# Patient Record
Sex: FEMALE | Race: WHITE | Employment: OTHER | URBAN - METROPOLITAN AREA
[De-identification: names, ages, dates, MRNs, and addresses within clinical notes are randomized per-mention and may not be internally consistent; named-entity substitution may affect disease eponyms.]

---

## 2017-05-11 ENCOUNTER — HOSPITAL ENCOUNTER (OUTPATIENT)
Dept: OTHER | Age: 82
Discharge: HOME | End: 2017-05-11
Attending: INTERNAL MEDICINE | Admitting: INTERNAL MEDICINE

## 2017-05-11 LAB
ALBUMIN SERPL-MCNC: 3.6 G/DL (ref 3.4–5)
ALP BLD-CCNC: 95 U/L (ref 40–129)
ALT SERPL-CCNC: 54 U/L (ref 10–40)
ANION GAP SERPL CALCULATED.3IONS-SCNC: 15 MMOL/L (ref 3–16)
AST SERPL-CCNC: 84 U/L (ref 15–37)
BILIRUB SERPL-MCNC: 1 MG/DL (ref 0–1)
BILIRUBIN DIRECT: <0.2 MG/DL (ref 0–0.3)
BILIRUBIN, INDIRECT: ABNORMAL MG/DL (ref 0–1)
BUN BLDV-MCNC: 18 MG/DL (ref 7–20)
CALCIUM SERPL-MCNC: 9.3 MG/DL (ref 8.3–10.6)
CHLORIDE BLD-SCNC: 106 MMOL/L (ref 99–110)
CO2: 23 MMOL/L (ref 21–32)
CREAT SERPL-MCNC: 1 MG/DL (ref 0.6–1.2)
GFR AFRICAN AMERICAN: >60
GFR NON-AFRICAN AMERICAN: 53
GLUCOSE BLD-MCNC: 78 MG/DL (ref 70–99)
PHOSPHORUS: 3.6 MG/DL (ref 2.5–4.9)
POTASSIUM SERPL-SCNC: 3.9 MMOL/L (ref 3.5–5.1)
SODIUM BLD-SCNC: 144 MMOL/L (ref 136–145)
TOTAL PROTEIN: 6.6 G/DL (ref 6.4–8.2)

## 2017-05-12 LAB — AFP-TUMOR MARKER: 3 NG/ML (ref 0–9)

## 2017-06-20 ENCOUNTER — HOSPITAL ENCOUNTER (OUTPATIENT)
Dept: OTHER | Age: 82
Discharge: HOME | End: 2017-06-20
Attending: INTERNAL MEDICINE | Admitting: INTERNAL MEDICINE

## 2017-06-20 LAB
ALBUMIN SERPL-MCNC: 3.8 G/DL (ref 3.4–5)
ALP BLD-CCNC: 88 U/L (ref 40–129)
ALT SERPL-CCNC: 75 U/L (ref 10–40)
AST SERPL-CCNC: 105 U/L (ref 15–37)
BILIRUB SERPL-MCNC: 1.1 MG/DL (ref 0–1)
BILIRUBIN DIRECT: <0.2 MG/DL (ref 0–0.3)
BILIRUBIN, INDIRECT: ABNORMAL MG/DL (ref 0–1)
TOTAL PROTEIN: 6.8 G/DL (ref 6.4–8.2)

## 2019-01-01 ENCOUNTER — HOSPITAL ENCOUNTER (OUTPATIENT)
Age: 84
Setting detail: OBSERVATION
Discharge: OTHER INSTITUTION | End: 2019-06-12
Attending: EMERGENCY MEDICINE | Admitting: INTERNAL MEDICINE
Payer: MEDICARE

## 2019-01-01 ENCOUNTER — APPOINTMENT (OUTPATIENT)
Dept: ULTRASOUND IMAGING | Age: 84
End: 2019-01-01
Payer: MEDICARE

## 2019-01-01 ENCOUNTER — APPOINTMENT (OUTPATIENT)
Dept: GENERAL RADIOLOGY | Age: 84
DRG: 177 | End: 2019-01-01
Payer: MEDICARE

## 2019-01-01 ENCOUNTER — APPOINTMENT (OUTPATIENT)
Dept: CT IMAGING | Age: 84
End: 2019-01-01
Payer: MEDICARE

## 2019-01-01 ENCOUNTER — HOSPITAL ENCOUNTER (INPATIENT)
Age: 84
LOS: 2 days | DRG: 177 | End: 2019-07-25
Attending: EMERGENCY MEDICINE | Admitting: INTERNAL MEDICINE
Payer: MEDICARE

## 2019-01-01 ENCOUNTER — HOSPITAL ENCOUNTER (INPATIENT)
Age: 84
LOS: 3 days | Discharge: NURSING, REHAB OR LONG-TERM CARE | DRG: 690 | End: 2019-06-21
Attending: EMERGENCY MEDICINE | Admitting: INTERNAL MEDICINE
Payer: MEDICARE

## 2019-01-01 ENCOUNTER — HOSPITAL ENCOUNTER (OUTPATIENT)
Dept: CT IMAGING | Age: 84
Discharge: HOME | End: 2019-07-03
Payer: MEDICARE

## 2019-01-01 ENCOUNTER — APPOINTMENT (OUTPATIENT)
Dept: GENERAL RADIOLOGY | Age: 84
End: 2019-01-01
Payer: MEDICARE

## 2019-01-01 ENCOUNTER — APPOINTMENT (OUTPATIENT)
Dept: CT IMAGING | Age: 84
DRG: 690 | End: 2019-01-01
Payer: MEDICARE

## 2019-01-01 ENCOUNTER — HOSPITAL ENCOUNTER (EMERGENCY)
Age: 84
Discharge: HOME | End: 2019-05-31
Payer: MEDICARE

## 2019-01-01 ENCOUNTER — APPOINTMENT (OUTPATIENT)
Dept: GENERAL RADIOLOGY | Age: 84
DRG: 690 | End: 2019-01-01
Payer: MEDICARE

## 2019-01-01 VITALS
SYSTOLIC BLOOD PRESSURE: 132 MMHG | HEIGHT: 64 IN | BODY MASS INDEX: 34.52 KG/M2 | WEIGHT: 202.2 LBS | OXYGEN SATURATION: 93 % | RESPIRATION RATE: 18 BRPM | HEART RATE: 60 BPM | TEMPERATURE: 98 F | DIASTOLIC BLOOD PRESSURE: 77 MMHG

## 2019-01-01 VITALS
SYSTOLIC BLOOD PRESSURE: 135 MMHG | BODY MASS INDEX: 35.32 KG/M2 | WEIGHT: 206.9 LBS | HEART RATE: 78 BPM | OXYGEN SATURATION: 94 % | TEMPERATURE: 98.5 F | DIASTOLIC BLOOD PRESSURE: 68 MMHG | HEIGHT: 64 IN | RESPIRATION RATE: 16 BRPM

## 2019-01-01 VITALS
SYSTOLIC BLOOD PRESSURE: 140 MMHG | DIASTOLIC BLOOD PRESSURE: 49 MMHG | WEIGHT: 195 LBS | BODY MASS INDEX: 32.49 KG/M2 | RESPIRATION RATE: 15 BRPM | OXYGEN SATURATION: 96 % | TEMPERATURE: 97.8 F | HEIGHT: 65 IN | HEART RATE: 72 BPM

## 2019-01-01 VITALS
TEMPERATURE: 95.5 F | HEIGHT: 64 IN | SYSTOLIC BLOOD PRESSURE: 74 MMHG | BODY MASS INDEX: 36.37 KG/M2 | WEIGHT: 213.06 LBS | RESPIRATION RATE: 10 BRPM | DIASTOLIC BLOOD PRESSURE: 42 MMHG | OXYGEN SATURATION: 72 % | HEART RATE: 32 BPM

## 2019-01-01 DIAGNOSIS — R91.1 LUNG NODULE: ICD-10-CM
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
## 2019-01-01 DIAGNOSIS — Z79.01 ANTICOAGULATED ON COUMADIN: ICD-10-CM

## 2019-01-01 DIAGNOSIS — F03.90 DEMENTIA WITHOUT BEHAVIORAL DISTURBANCE, UNSPECIFIED DEMENTIA TYPE: ICD-10-CM

## 2019-01-01 DIAGNOSIS — S09.90XA CLOSED HEAD INJURY, INITIAL ENCOUNTER: ICD-10-CM

## 2019-01-01 DIAGNOSIS — R52 PAIN: ICD-10-CM

## 2019-01-01 DIAGNOSIS — S09.90XA INJURY OF HEAD, INITIAL ENCOUNTER: ICD-10-CM

## 2019-01-01 DIAGNOSIS — E72.20 HYPERAMMONEMIA (HCC): ICD-10-CM

## 2019-01-01 DIAGNOSIS — J18.9 HCAP (HEALTHCARE-ASSOCIATED PNEUMONIA): Primary | ICD-10-CM
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
## 2019-01-01 DIAGNOSIS — S16.1XXA STRAIN OF NECK MUSCLE, INITIAL ENCOUNTER: ICD-10-CM

## 2019-01-01 DIAGNOSIS — K86.2 PANCREATIC CYST: ICD-10-CM

## 2019-01-01 DIAGNOSIS — R42 DIZZINESS: ICD-10-CM

## 2019-01-01 DIAGNOSIS — A41.9 SEVERE SEPSIS (HCC): ICD-10-CM

## 2019-01-01 DIAGNOSIS — R65.20 SEVERE SEPSIS (HCC): ICD-10-CM

## 2019-01-01 DIAGNOSIS — N30.00 ACUTE CYSTITIS WITHOUT HEMATURIA: ICD-10-CM
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
## 2019-01-01 DIAGNOSIS — N17.9 AKI (ACUTE KIDNEY INJURY) (HCC): ICD-10-CM
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
## 2019-01-01 LAB
A/G RATIO: 0.9 (ref 1.1–2.2)
A/G RATIO: 1.1 (ref 1.1–2.2)
A/G RATIO: 1.1 (ref 1.1–2.2)
A/G RATIO: 1.2 (ref 1.1–2.2)
ALBUMIN SERPL-MCNC: 2.8 G/DL (ref 3.1–4.9)
ALBUMIN SERPL-MCNC: 2.9 G/DL (ref 3.4–5)
ALBUMIN SERPL-MCNC: 3.2 G/DL (ref 3.4–5)
ALBUMIN SERPL-MCNC: 3.4 G/DL (ref 3.4–5)
ALBUMIN SERPL-MCNC: 3.5 G/DL (ref 3.4–5)
ALP BLD-CCNC: 110 U/L (ref 40–129)
ALP BLD-CCNC: 112 U/L (ref 40–129)
ALP BLD-CCNC: 113 U/L (ref 40–129)
ALP BLD-CCNC: 122 U/L (ref 40–129)
ALP BLD-CCNC: 133 U/L (ref 40–129)
ALPHA-1-GLOBULIN: 0.2 G/DL (ref 0.2–0.4)
ALPHA-2-GLOBULIN: 0.6 G/DL (ref 0.4–1.1)
ALT SERPL-CCNC: 22 U/L (ref 10–40)
ALT SERPL-CCNC: 23 U/L (ref 10–40)
ALT SERPL-CCNC: 25 U/L (ref 10–40)
ALT SERPL-CCNC: 25 U/L (ref 10–40)
ALT SERPL-CCNC: 53 U/L (ref 10–40)
AMMONIA: 35 UMOL/L (ref 11–51)
AMMONIA: 79 UMOL/L (ref 11–51)
AMMONIA: 83 UMOL/L (ref 11–51)
ANION GAP SERPL CALCULATED.3IONS-SCNC: 10 MMOL/L (ref 3–16)
ANION GAP SERPL CALCULATED.3IONS-SCNC: 11 MMOL/L (ref 3–16)
ANION GAP SERPL CALCULATED.3IONS-SCNC: 11 MMOL/L (ref 3–16)
ANION GAP SERPL CALCULATED.3IONS-SCNC: 12 MMOL/L (ref 3–16)
ANION GAP SERPL CALCULATED.3IONS-SCNC: 12 MMOL/L (ref 3–16)
ANION GAP SERPL CALCULATED.3IONS-SCNC: 13 MMOL/L (ref 3–16)
ANION GAP SERPL CALCULATED.3IONS-SCNC: 6 MMOL/L (ref 3–16)
ANION GAP SERPL CALCULATED.3IONS-SCNC: 9 MMOL/L (ref 3–16)
ANION GAP SERPL CALCULATED.3IONS-SCNC: 9 MMOL/L (ref 3–16)
APTT: 32.2 SEC (ref 26–36)
APTT: 40.4 SEC (ref 26–36)
AST SERPL-CCNC: 30 U/L (ref 15–37)
AST SERPL-CCNC: 34 U/L (ref 15–37)
AST SERPL-CCNC: 39 U/L (ref 15–37)
AST SERPL-CCNC: 39 U/L (ref 15–37)
AST SERPL-CCNC: 84 U/L (ref 15–37)
BACTERIA: ABNORMAL /HPF
BACTERIA: ABNORMAL /HPF
BASOPHILS ABSOLUTE: 0 K/UL (ref 0–0.2)
BASOPHILS RELATIVE PERCENT: 0.2 %
BASOPHILS RELATIVE PERCENT: 0.2 %
BASOPHILS RELATIVE PERCENT: 0.4 %
BASOPHILS RELATIVE PERCENT: 0.5 %
BASOPHILS RELATIVE PERCENT: 0.5 %
BASOPHILS RELATIVE PERCENT: 0.6 %
BASOPHILS RELATIVE PERCENT: 0.6 %
BASOPHILS RELATIVE PERCENT: 0.7 %
BASOPHILS RELATIVE PERCENT: 0.9 %
BETA GLOBULIN: 0.8 G/DL (ref 0.9–1.6)
BILIRUB SERPL-MCNC: 0.6 MG/DL (ref 0–1)
BILIRUB SERPL-MCNC: 0.8 MG/DL (ref 0–1)
BILIRUB SERPL-MCNC: 0.9 MG/DL (ref 0–1)
BILIRUB SERPL-MCNC: 1 MG/DL (ref 0–1)
BILIRUB SERPL-MCNC: 1.9 MG/DL (ref 0–1)
BILIRUBIN DIRECT: <0.2 MG/DL (ref 0–0.3)
BILIRUBIN URINE: NEGATIVE
BILIRUBIN, INDIRECT: ABNORMAL MG/DL (ref 0–1)
BLOOD CULTURE, ROUTINE: NORMAL
BLOOD, URINE: ABNORMAL
BLOOD, URINE: ABNORMAL
BLOOD, URINE: NEGATIVE
BUN BLDV-MCNC: 14 MG/DL (ref 7–20)
BUN BLDV-MCNC: 17 MG/DL (ref 7–20)
BUN BLDV-MCNC: 19 MG/DL (ref 7–20)
BUN BLDV-MCNC: 20 MG/DL (ref 7–20)
BUN BLDV-MCNC: 20 MG/DL (ref 7–20)
BUN BLDV-MCNC: 23 MG/DL (ref 7–20)
BUN BLDV-MCNC: 24 MG/DL (ref 7–20)
BUN BLDV-MCNC: 24 MG/DL (ref 7–20)
BUN BLDV-MCNC: 38 MG/DL (ref 7–20)
CALCIUM SERPL-MCNC: 10.2 MG/DL (ref 8.3–10.6)
CALCIUM SERPL-MCNC: 10.2 MG/DL (ref 8.3–10.6)
CALCIUM SERPL-MCNC: 8.7 MG/DL (ref 8.3–10.6)
CALCIUM SERPL-MCNC: 8.9 MG/DL (ref 8.3–10.6)
CALCIUM SERPL-MCNC: 9.1 MG/DL (ref 8.3–10.6)
CALCIUM SERPL-MCNC: 9.3 MG/DL (ref 8.3–10.6)
CALCIUM SERPL-MCNC: 9.3 MG/DL (ref 8.3–10.6)
CALCIUM SERPL-MCNC: 9.4 MG/DL (ref 8.3–10.6)
CALCIUM SERPL-MCNC: 9.8 MG/DL (ref 8.3–10.6)
CHLORIDE BLD-SCNC: 105 MMOL/L (ref 99–110)
CHLORIDE BLD-SCNC: 105 MMOL/L (ref 99–110)
CHLORIDE BLD-SCNC: 108 MMOL/L (ref 99–110)
CHLORIDE BLD-SCNC: 110 MMOL/L (ref 99–110)
CHLORIDE BLD-SCNC: 110 MMOL/L (ref 99–110)
CHLORIDE BLD-SCNC: 113 MMOL/L (ref 99–110)
CHLORIDE BLD-SCNC: 114 MMOL/L (ref 99–110)
CHLORIDE BLD-SCNC: 115 MMOL/L (ref 99–110)
CHLORIDE BLD-SCNC: 115 MMOL/L (ref 99–110)
CHLORIDE URINE RANDOM: 58 MMOL/L
CLARITY: ABNORMAL
CLARITY: ABNORMAL
CLARITY: CLEAR
CO2: 19 MMOL/L (ref 21–32)
CO2: 19 MMOL/L (ref 21–32)
CO2: 20 MMOL/L (ref 21–32)
CO2: 20 MMOL/L (ref 21–32)
CO2: 21 MMOL/L (ref 21–32)
CO2: 22 MMOL/L (ref 21–32)
CO2: 23 MMOL/L (ref 21–32)
CO2: 23 MMOL/L (ref 21–32)
CO2: 26 MMOL/L (ref 21–32)
COLOR: YELLOW
CREAT SERPL-MCNC: 1.1 MG/DL (ref 0.6–1.2)
CREAT SERPL-MCNC: 1.2 MG/DL (ref 0.6–1.2)
CREAT SERPL-MCNC: 1.3 MG/DL (ref 0.6–1.2)
CREAT SERPL-MCNC: 1.4 MG/DL (ref 0.6–1.2)
CREAT SERPL-MCNC: 1.7 MG/DL (ref 0.6–1.2)
CREAT SERPL-MCNC: 1.7 MG/DL (ref 0.6–1.2)
CREAT SERPL-MCNC: 2 MG/DL (ref 0.6–1.2)
CREATININE URINE: 88.9 MG/DL (ref 28–259)
EKG ATRIAL RATE: 77 BPM
EKG ATRIAL RATE: 87 BPM
EKG DIAGNOSIS: NORMAL
EKG DIAGNOSIS: NORMAL
EKG P AXIS: 16 DEGREES
EKG P AXIS: 29 DEGREES
EKG P-R INTERVAL: 124 MS
EKG P-R INTERVAL: 132 MS
EKG Q-T INTERVAL: 366 MS
EKG Q-T INTERVAL: 406 MS
EKG QRS DURATION: 90 MS
EKG QRS DURATION: 94 MS
EKG QTC CALCULATION (BAZETT): 440 MS
EKG QTC CALCULATION (BAZETT): 459 MS
EKG R AXIS: -30 DEGREES
EKG R AXIS: -42 DEGREES
EKG T AXIS: 36 DEGREES
EKG T AXIS: 48 DEGREES
EKG VENTRICULAR RATE: 77 BPM
EKG VENTRICULAR RATE: 87 BPM
EOSINOPHILS ABSOLUTE: 0 K/UL (ref 0–0.6)
EOSINOPHILS ABSOLUTE: 0.2 K/UL (ref 0–0.6)
EOSINOPHILS ABSOLUTE: 0.3 K/UL (ref 0–0.6)
EOSINOPHILS ABSOLUTE: 0.4 K/UL (ref 0–0.6)
EOSINOPHILS RELATIVE PERCENT: 0.1 %
EOSINOPHILS RELATIVE PERCENT: 10.3 %
EOSINOPHILS RELATIVE PERCENT: 3.3 %
EOSINOPHILS RELATIVE PERCENT: 5.1 %
EOSINOPHILS RELATIVE PERCENT: 6.7 %
EOSINOPHILS RELATIVE PERCENT: 7.6 %
EOSINOPHILS RELATIVE PERCENT: 7.6 %
EOSINOPHILS RELATIVE PERCENT: 7.9 %
EOSINOPHILS RELATIVE PERCENT: 9 %
EPITHELIAL CELLS, UA: 1 /HPF (ref 0–5)
EPITHELIAL CELLS, UA: 4 /HPF (ref 0–5)
F-ACTIN AB IGG: 41 UNITS (ref 0–19)
GAMMA GLOBULIN: 1.2 G/DL (ref 0.6–1.8)
GFR AFRICAN AMERICAN: 29
GFR AFRICAN AMERICAN: 35
GFR AFRICAN AMERICAN: 35
GFR AFRICAN AMERICAN: 43
GFR AFRICAN AMERICAN: 47
GFR AFRICAN AMERICAN: 52
GFR AFRICAN AMERICAN: 57
GFR NON-AFRICAN AMERICAN: 24
GFR NON-AFRICAN AMERICAN: 29
GFR NON-AFRICAN AMERICAN: 29
GFR NON-AFRICAN AMERICAN: 36
GFR NON-AFRICAN AMERICAN: 39
GFR NON-AFRICAN AMERICAN: 43
GFR NON-AFRICAN AMERICAN: 47
GLOBULIN: 2.6 G/DL
GLOBULIN: 3 G/DL
GLOBULIN: 3.1 G/DL
GLOBULIN: 3.6 G/DL
GLUCOSE BLD-MCNC: 104 MG/DL (ref 70–99)
GLUCOSE BLD-MCNC: 113 MG/DL (ref 70–99)
GLUCOSE BLD-MCNC: 138 MG/DL (ref 70–99)
GLUCOSE BLD-MCNC: 139 MG/DL (ref 70–99)
GLUCOSE BLD-MCNC: 91 MG/DL (ref 70–99)
GLUCOSE BLD-MCNC: 94 MG/DL (ref 70–99)
GLUCOSE BLD-MCNC: 98 MG/DL (ref 70–99)
GLUCOSE BLD-MCNC: 99 MG/DL (ref 70–99)
GLUCOSE BLD-MCNC: 99 MG/DL (ref 70–99)
GLUCOSE URINE: NEGATIVE MG/DL
HCT VFR BLD CALC: 34.4 % (ref 36–48)
HCT VFR BLD CALC: 34.8 % (ref 36–48)
HCT VFR BLD CALC: 36.8 % (ref 36–48)
HCT VFR BLD CALC: 37 % (ref 36–48)
HCT VFR BLD CALC: 39.4 % (ref 36–48)
HCT VFR BLD CALC: 39.5 % (ref 36–48)
HCT VFR BLD CALC: 39.6 % (ref 36–48)
HCT VFR BLD CALC: 39.7 % (ref 36–48)
HCT VFR BLD CALC: 40.2 % (ref 36–48)
HEMOGLOBIN: 11.5 G/DL (ref 12–16)
HEMOGLOBIN: 11.8 G/DL (ref 12–16)
HEMOGLOBIN: 12.3 G/DL (ref 12–16)
HEMOGLOBIN: 12.5 G/DL (ref 12–16)
HEMOGLOBIN: 13.2 G/DL (ref 12–16)
HEMOGLOBIN: 13.2 G/DL (ref 12–16)
HEMOGLOBIN: 13.3 G/DL (ref 12–16)
HEMOGLOBIN: 13.3 G/DL (ref 12–16)
HEMOGLOBIN: 13.4 G/DL (ref 12–16)
HYALINE CASTS: 4 /LPF (ref 0–8)
HYALINE CASTS: 8 /LPF (ref 0–8)
INR BLD: 1.72 (ref 0.86–1.14)
INR BLD: 1.75 (ref 0.86–1.14)
INR BLD: 1.78 (ref 0.86–1.14)
INR BLD: 1.93 (ref 0.86–1.14)
INR BLD: 1.97 (ref 0.86–1.14)
INR BLD: 2.07 (ref 0.86–1.14)
INR BLD: 2.15 (ref 0.86–1.14)
INR BLD: 2.24 (ref 0.86–1.14)
INR BLD: 2.96 (ref 0.86–1.14)
INR BLD: 3.02 (ref 0.86–1.14)
KETONES, URINE: ABNORMAL MG/DL
KETONES, URINE: NEGATIVE MG/DL
KETONES, URINE: NEGATIVE MG/DL
LACTIC ACID, SEPSIS: 2 MMOL/L (ref 0.4–1.9)
LACTIC ACID, SEPSIS: 2.8 MMOL/L (ref 0.4–1.9)
LACTIC ACID: 1.9 MMOL/L (ref 0.4–2)
LEUKOCYTE ESTERASE, URINE: ABNORMAL
LEUKOCYTE ESTERASE, URINE: ABNORMAL
LEUKOCYTE ESTERASE, URINE: NEGATIVE
LV EF: 68 %
LVEF MODALITY: NORMAL
LYMPHOCYTES ABSOLUTE: 0.3 K/UL (ref 1–5.1)
LYMPHOCYTES ABSOLUTE: 0.4 K/UL (ref 1–5.1)
LYMPHOCYTES ABSOLUTE: 0.6 K/UL (ref 1–5.1)
LYMPHOCYTES ABSOLUTE: 0.7 K/UL (ref 1–5.1)
LYMPHOCYTES ABSOLUTE: 0.7 K/UL (ref 1–5.1)
LYMPHOCYTES ABSOLUTE: 0.8 K/UL (ref 1–5.1)
LYMPHOCYTES ABSOLUTE: 0.9 K/UL (ref 1–5.1)
LYMPHOCYTES RELATIVE PERCENT: 11.8 %
LYMPHOCYTES RELATIVE PERCENT: 13.7 %
LYMPHOCYTES RELATIVE PERCENT: 13.9 %
LYMPHOCYTES RELATIVE PERCENT: 14.3 %
LYMPHOCYTES RELATIVE PERCENT: 15.4 %
LYMPHOCYTES RELATIVE PERCENT: 20.7 %
LYMPHOCYTES RELATIVE PERCENT: 20.7 %
LYMPHOCYTES RELATIVE PERCENT: 3.2 %
LYMPHOCYTES RELATIVE PERCENT: 6.8 %
MACROCYTES: ABNORMAL
MCH RBC QN AUTO: 33.2 PG (ref 26–34)
MCH RBC QN AUTO: 33.8 PG (ref 26–34)
MCH RBC QN AUTO: 33.9 PG (ref 26–34)
MCH RBC QN AUTO: 33.9 PG (ref 26–34)
MCH RBC QN AUTO: 34.2 PG (ref 26–34)
MCHC RBC AUTO-ENTMCNC: 32.9 G/DL (ref 31–36)
MCHC RBC AUTO-ENTMCNC: 33.4 G/DL (ref 31–36)
MCHC RBC AUTO-ENTMCNC: 33.4 G/DL (ref 31–36)
MCHC RBC AUTO-ENTMCNC: 33.5 G/DL (ref 31–36)
MCHC RBC AUTO-ENTMCNC: 33.5 G/DL (ref 31–36)
MCHC RBC AUTO-ENTMCNC: 33.7 G/DL (ref 31–36)
MCHC RBC AUTO-ENTMCNC: 33.7 G/DL (ref 31–36)
MCHC RBC AUTO-ENTMCNC: 33.9 G/DL (ref 31–36)
MCHC RBC AUTO-ENTMCNC: 33.9 G/DL (ref 31–36)
MCV RBC AUTO: 100.4 FL (ref 80–100)
MCV RBC AUTO: 100.8 FL (ref 80–100)
MCV RBC AUTO: 101 FL (ref 80–100)
MCV RBC AUTO: 101.2 FL (ref 80–100)
MCV RBC AUTO: 101.3 FL (ref 80–100)
MCV RBC AUTO: 101.4 FL (ref 80–100)
MCV RBC AUTO: 102.1 FL (ref 80–100)
MCV RBC AUTO: 103.3 FL (ref 80–100)
MCV RBC AUTO: 98.1 FL (ref 80–100)
MICROSCOPIC EXAMINATION: NORMAL
MICROSCOPIC EXAMINATION: YES
MICROSCOPIC EXAMINATION: YES
MONOCYTES ABSOLUTE: 0.5 K/UL (ref 0–1.3)
MONOCYTES ABSOLUTE: 0.5 K/UL (ref 0–1.3)
MONOCYTES ABSOLUTE: 0.6 K/UL (ref 0–1.3)
MONOCYTES ABSOLUTE: 0.7 K/UL (ref 0–1.3)
MONOCYTES ABSOLUTE: 0.7 K/UL (ref 0–1.3)
MONOCYTES ABSOLUTE: 0.8 K/UL (ref 0–1.3)
MONOCYTES RELATIVE PERCENT: 10.6 %
MONOCYTES RELATIVE PERCENT: 11.6 %
MONOCYTES RELATIVE PERCENT: 13.2 %
MONOCYTES RELATIVE PERCENT: 13.6 %
MONOCYTES RELATIVE PERCENT: 14.2 %
MONOCYTES RELATIVE PERCENT: 15.5 %
MONOCYTES RELATIVE PERCENT: 16.3 %
MONOCYTES RELATIVE PERCENT: 6.5 %
MONOCYTES RELATIVE PERCENT: 8.2 %
NEUTROPHILS ABSOLUTE: 1.7 K/UL (ref 1.7–7.7)
NEUTROPHILS ABSOLUTE: 2.3 K/UL (ref 1.7–7.7)
NEUTROPHILS ABSOLUTE: 2.7 K/UL (ref 1.7–7.7)
NEUTROPHILS ABSOLUTE: 3.5 K/UL (ref 1.7–7.7)
NEUTROPHILS ABSOLUTE: 3.5 K/UL (ref 1.7–7.7)
NEUTROPHILS ABSOLUTE: 3.7 K/UL (ref 1.7–7.7)
NEUTROPHILS ABSOLUTE: 4.2 K/UL (ref 1.7–7.7)
NEUTROPHILS ABSOLUTE: 5.3 K/UL (ref 1.7–7.7)
NEUTROPHILS ABSOLUTE: 8.7 K/UL (ref 1.7–7.7)
NEUTROPHILS RELATIVE PERCENT: 52.6 %
NEUTROPHILS RELATIVE PERCENT: 54.7 %
NEUTROPHILS RELATIVE PERCENT: 63.3 %
NEUTROPHILS RELATIVE PERCENT: 63.8 %
NEUTROPHILS RELATIVE PERCENT: 64 %
NEUTROPHILS RELATIVE PERCENT: 65.7 %
NEUTROPHILS RELATIVE PERCENT: 73.8 %
NEUTROPHILS RELATIVE PERCENT: 79.7 %
NEUTROPHILS RELATIVE PERCENT: 90 %
NITRITE, URINE: NEGATIVE
NITRITE, URINE: NEGATIVE
NITRITE, URINE: POSITIVE
ORGANISM: ABNORMAL
ORGANISM: ABNORMAL
OSMOLALITY URINE: 312 MOSM/KG (ref 390–1070)
PDW BLD-RTO: 13.6 % (ref 12.4–15.4)
PDW BLD-RTO: 13.6 % (ref 12.4–15.4)
PDW BLD-RTO: 13.7 % (ref 12.4–15.4)
PDW BLD-RTO: 13.9 % (ref 12.4–15.4)
PDW BLD-RTO: 14.1 % (ref 12.4–15.4)
PDW BLD-RTO: 14.1 % (ref 12.4–15.4)
PDW BLD-RTO: 14.3 % (ref 12.4–15.4)
PDW BLD-RTO: 14.4 % (ref 12.4–15.4)
PDW BLD-RTO: 14.7 % (ref 12.4–15.4)
PH UA: 5 (ref 5–8)
PH UA: 5.5 (ref 5–8)
PH UA: 6 (ref 5–8)
PHOSPHORUS: 2.8 MG/DL (ref 2.5–4.9)
PLATELET # BLD: 102 K/UL (ref 135–450)
PLATELET # BLD: 107 K/UL (ref 135–450)
PLATELET # BLD: 107 K/UL (ref 135–450)
PLATELET # BLD: 130 K/UL (ref 135–450)
PLATELET # BLD: 64 K/UL (ref 135–450)
PLATELET # BLD: 77 K/UL (ref 135–450)
PLATELET # BLD: 80 K/UL (ref 135–450)
PLATELET # BLD: 82 K/UL (ref 135–450)
PLATELET # BLD: 86 K/UL (ref 135–450)
PLATELET SLIDE REVIEW: ABNORMAL
PLATELET SLIDE REVIEW: ADEQUATE
PMV BLD AUTO: 10.2 FL (ref 5–10.5)
PMV BLD AUTO: 10.2 FL (ref 5–10.5)
PMV BLD AUTO: 10.7 FL (ref 5–10.5)
PMV BLD AUTO: 9.4 FL (ref 5–10.5)
PMV BLD AUTO: 9.4 FL (ref 5–10.5)
PMV BLD AUTO: 9.6 FL (ref 5–10.5)
PMV BLD AUTO: 9.7 FL (ref 5–10.5)
PMV BLD AUTO: 9.8 FL (ref 5–10.5)
PMV BLD AUTO: 9.9 FL (ref 5–10.5)
POTASSIUM REFLEX MAGNESIUM: 4.1 MMOL/L (ref 3.5–5.1)
POTASSIUM REFLEX MAGNESIUM: 4.1 MMOL/L (ref 3.5–5.1)
POTASSIUM REFLEX MAGNESIUM: 4.7 MMOL/L (ref 3.5–5.1)
POTASSIUM REFLEX MAGNESIUM: 5.1 MMOL/L (ref 3.5–5.1)
POTASSIUM SERPL-SCNC: 4.1 MMOL/L (ref 3.5–5.1)
POTASSIUM SERPL-SCNC: 4.1 MMOL/L (ref 3.5–5.1)
POTASSIUM SERPL-SCNC: 4.2 MMOL/L (ref 3.5–5.1)
POTASSIUM SERPL-SCNC: 4.2 MMOL/L (ref 3.5–5.1)
POTASSIUM SERPL-SCNC: 4.5 MMOL/L (ref 3.5–5.1)
POTASSIUM, UR: 24.2 MMOL/L
PRO-BNP: 106 PG/ML (ref 0–449)
PRO-BNP: 125 PG/ML (ref 0–449)
PRO-BNP: 675 PG/ML (ref 0–449)
PROTEIN PROTEIN: 0.02 G/DL
PROTEIN PROTEIN: 16 MG/DL
PROTEIN UA: 100 MG/DL
PROTEIN UA: NEGATIVE MG/DL
PROTEIN UA: NEGATIVE MG/DL
PROTHROMBIN TIME: 19.6 SEC (ref 9.8–13)
PROTHROMBIN TIME: 20 SEC (ref 9.8–13)
PROTHROMBIN TIME: 20.3 SEC (ref 9.8–13)
PROTHROMBIN TIME: 22 SEC (ref 9.8–13)
PROTHROMBIN TIME: 22.5 SEC (ref 9.8–13)
PROTHROMBIN TIME: 23.6 SEC (ref 9.8–13)
PROTHROMBIN TIME: 24.5 SEC (ref 9.8–13)
PROTHROMBIN TIME: 25.5 SEC (ref 9.8–13)
PROTHROMBIN TIME: 33.8 SEC (ref 9.8–13)
PROTHROMBIN TIME: 34.4 SEC (ref 9.8–13)
RBC # BLD: 3.39 M/UL (ref 4–5.2)
RBC # BLD: 3.45 M/UL (ref 4–5.2)
RBC # BLD: 3.64 M/UL (ref 4–5.2)
RBC # BLD: 3.65 M/UL (ref 4–5.2)
RBC # BLD: 3.89 M/UL (ref 4–5.2)
RBC # BLD: 3.89 M/UL (ref 4–5.2)
RBC # BLD: 3.92 M/UL (ref 4–5.2)
RBC # BLD: 3.92 M/UL (ref 4–5.2)
RBC # BLD: 4.02 M/UL (ref 4–5.2)
RBC UA: 8 /HPF (ref 0–4)
RBC UA: ABNORMAL /HPF (ref 0–2)
SLIDE REVIEW: ABNORMAL
SLIDE REVIEW: ABNORMAL
SMOOTH MUSCLE AB IGG TITER: ABNORMAL
SODIUM BLD-SCNC: 137 MMOL/L (ref 136–145)
SODIUM BLD-SCNC: 140 MMOL/L (ref 136–145)
SODIUM BLD-SCNC: 142 MMOL/L (ref 136–145)
SODIUM BLD-SCNC: 143 MMOL/L (ref 136–145)
SODIUM BLD-SCNC: 144 MMOL/L (ref 136–145)
SODIUM BLD-SCNC: 145 MMOL/L (ref 136–145)
SODIUM BLD-SCNC: 146 MMOL/L (ref 136–145)
SODIUM URINE: 64 MMOL/L
SPE/IFE INTERPRETATION: NORMAL
SPECIFIC GRAVITY UA: 1.01 (ref 1–1.03)
SPECIFIC GRAVITY UA: 1.01 (ref 1–1.03)
SPECIFIC GRAVITY UA: 1.02 (ref 1–1.03)
TOTAL CK: 100 U/L (ref 26–192)
TOTAL CK: 65 U/L (ref 26–192)
TOTAL PROTEIN: 5.5 G/DL (ref 6.4–8.2)
TOTAL PROTEIN: 5.6 G/DL (ref 6.4–8.2)
TOTAL PROTEIN: 6.2 G/DL (ref 6.4–8.2)
TOTAL PROTEIN: 6.5 G/DL (ref 6.4–8.2)
TOTAL PROTEIN: 6.5 G/DL (ref 6.4–8.2)
TOTAL PROTEIN: 7 G/DL (ref 6.4–8.2)
TROPONIN: <0.01 NG/ML
URINE CULTURE, ROUTINE: ABNORMAL
URINE ELECTROPHORESIS INTERP: NORMAL
URINE REFLEX TO CULTURE: NORMAL
URINE REFLEX TO CULTURE: YES
URINE TYPE: ABNORMAL
URINE TYPE: NORMAL
URINE TYPE: NORMAL
UROBILINOGEN, URINE: 0.2 E.U./DL
WBC # BLD: 3.3 K/UL (ref 4–11)
WBC # BLD: 4.1 K/UL (ref 4–11)
WBC # BLD: 4.3 K/UL (ref 4–11)
WBC # BLD: 5.3 K/UL (ref 4–11)
WBC # BLD: 5.5 K/UL (ref 4–11)
WBC # BLD: 5.7 K/UL (ref 4–11)
WBC # BLD: 5.8 K/UL (ref 4–11)
WBC # BLD: 6.6 K/UL (ref 4–11)
WBC # BLD: 9.7 K/UL (ref 4–11)
WBC UA: 54 /HPF (ref 0–5)
WBC UA: >900 /HPF (ref 0–5)

## 2019-01-01 PROCEDURE — G0378 HOSPITAL OBSERVATION PER HR: HCPCS

## 2019-01-01 PROCEDURE — 93010 ELECTROCARDIOGRAM REPORT: CPT | Performed by: INTERNAL MEDICINE

## 2019-01-01 PROCEDURE — 97530 THERAPEUTIC ACTIVITIES: CPT

## 2019-01-01 PROCEDURE — 36415 COLL VENOUS BLD VENIPUNCTURE: CPT

## 2019-01-01 PROCEDURE — 84484 ASSAY OF TROPONIN QUANT: CPT

## 2019-01-01 PROCEDURE — 74177 CT ABD & PELVIS W/CONTRAST: CPT

## 2019-01-01 PROCEDURE — 96375 TX/PRO/DX INJ NEW DRUG ADDON: CPT

## 2019-01-01 PROCEDURE — 84165 PROTEIN E-PHORESIS SERUM: CPT

## 2019-01-01 PROCEDURE — 6370000000 HC RX 637 (ALT 250 FOR IP): Performed by: INTERNAL MEDICINE

## 2019-01-01 PROCEDURE — 96376 TX/PRO/DX INJ SAME DRUG ADON: CPT

## 2019-01-01 PROCEDURE — 72131 CT LUMBAR SPINE W/O DYE: CPT

## 2019-01-01 PROCEDURE — 70450 CT HEAD/BRAIN W/O DYE: CPT

## 2019-01-01 PROCEDURE — 1200000000 HC SEMI PRIVATE

## 2019-01-01 PROCEDURE — 2580000003 HC RX 258: Performed by: INTERNAL MEDICINE

## 2019-01-01 PROCEDURE — 85610 PROTHROMBIN TIME: CPT

## 2019-01-01 PROCEDURE — 85025 COMPLETE CBC W/AUTO DIFF WBC: CPT

## 2019-01-01 PROCEDURE — 6360000002 HC RX W HCPCS: Performed by: PHYSICIAN ASSISTANT

## 2019-01-01 PROCEDURE — 94761 N-INVAS EAR/PLS OXIMETRY MLT: CPT

## 2019-01-01 PROCEDURE — 76705 ECHO EXAM OF ABDOMEN: CPT

## 2019-01-01 PROCEDURE — 6360000002 HC RX W HCPCS: Performed by: NURSE PRACTITIONER

## 2019-01-01 PROCEDURE — 51798 US URINE CAPACITY MEASURE: CPT

## 2019-01-01 PROCEDURE — 6360000002 HC RX W HCPCS: Performed by: INTERNAL MEDICINE

## 2019-01-01 PROCEDURE — 92526 ORAL FUNCTION THERAPY: CPT

## 2019-01-01 PROCEDURE — 87077 CULTURE AEROBIC IDENTIFY: CPT

## 2019-01-01 PROCEDURE — 96360 HYDRATION IV INFUSION INIT: CPT

## 2019-01-01 PROCEDURE — 71045 X-RAY EXAM CHEST 1 VIEW: CPT

## 2019-01-01 PROCEDURE — 96361 HYDRATE IV INFUSION ADD-ON: CPT

## 2019-01-01 PROCEDURE — 96365 THER/PROPH/DIAG IV INF INIT: CPT

## 2019-01-01 PROCEDURE — 93306 TTE W/DOPPLER COMPLETE: CPT

## 2019-01-01 PROCEDURE — 84300 ASSAY OF URINE SODIUM: CPT

## 2019-01-01 PROCEDURE — 83516 IMMUNOASSAY NONANTIBODY: CPT

## 2019-01-01 PROCEDURE — 97166 OT EVAL MOD COMPLEX 45 MIN: CPT

## 2019-01-01 PROCEDURE — 72125 CT NECK SPINE W/O DYE: CPT

## 2019-01-01 PROCEDURE — 99222 1ST HOSP IP/OBS MODERATE 55: CPT | Performed by: INTERNAL MEDICINE

## 2019-01-01 PROCEDURE — 87086 URINE CULTURE/COLONY COUNT: CPT

## 2019-01-01 PROCEDURE — 82570 ASSAY OF URINE CREATININE: CPT

## 2019-01-01 PROCEDURE — 80048 BASIC METABOLIC PNL TOTAL CA: CPT

## 2019-01-01 PROCEDURE — 84156 ASSAY OF PROTEIN URINE: CPT

## 2019-01-01 PROCEDURE — 2700000000 HC OXYGEN THERAPY PER DAY

## 2019-01-01 PROCEDURE — 82550 ASSAY OF CK (CPK): CPT

## 2019-01-01 PROCEDURE — 94640 AIRWAY INHALATION TREATMENT: CPT

## 2019-01-01 PROCEDURE — 6370000000 HC RX 637 (ALT 250 FOR IP): Performed by: NURSE PRACTITIONER

## 2019-01-01 PROCEDURE — 80053 COMPREHEN METABOLIC PANEL: CPT

## 2019-01-01 PROCEDURE — 2580000003 HC RX 258: Performed by: EMERGENCY MEDICINE

## 2019-01-01 PROCEDURE — 81001 URINALYSIS AUTO W/SCOPE: CPT

## 2019-01-01 PROCEDURE — 93005 ELECTROCARDIOGRAM TRACING: CPT | Performed by: PHYSICIAN ASSISTANT

## 2019-01-01 PROCEDURE — 97535 SELF CARE MNGMENT TRAINING: CPT

## 2019-01-01 PROCEDURE — 92610 EVALUATE SWALLOWING FUNCTION: CPT

## 2019-01-01 PROCEDURE — 87040 BLOOD CULTURE FOR BACTERIA: CPT

## 2019-01-01 PROCEDURE — 93005 ELECTROCARDIOGRAM TRACING: CPT | Performed by: EMERGENCY MEDICINE

## 2019-01-01 PROCEDURE — 97161 PT EVAL LOW COMPLEX 20 MIN: CPT

## 2019-01-01 PROCEDURE — 83935 ASSAY OF URINE OSMOLALITY: CPT

## 2019-01-01 PROCEDURE — 6370000000 HC RX 637 (ALT 250 FOR IP): Performed by: PHYSICIAN ASSISTANT

## 2019-01-01 PROCEDURE — 97162 PT EVAL MOD COMPLEX 30 MIN: CPT

## 2019-01-01 PROCEDURE — 83880 ASSAY OF NATRIURETIC PEPTIDE: CPT

## 2019-01-01 PROCEDURE — 99285 EMERGENCY DEPT VISIT HI MDM: CPT

## 2019-01-01 PROCEDURE — 94760 N-INVAS EAR/PLS OXIMETRY 1: CPT

## 2019-01-01 PROCEDURE — 73552 X-RAY EXAM OF FEMUR 2/>: CPT

## 2019-01-01 PROCEDURE — 83605 ASSAY OF LACTIC ACID: CPT

## 2019-01-01 PROCEDURE — 84166 PROTEIN E-PHORESIS/URINE/CSF: CPT

## 2019-01-01 PROCEDURE — 73522 X-RAY EXAM HIPS BI 3-4 VIEWS: CPT

## 2019-01-01 PROCEDURE — 82436 ASSAY OF URINE CHLORIDE: CPT

## 2019-01-01 PROCEDURE — 2580000003 HC RX 258: Performed by: NURSE PRACTITIONER

## 2019-01-01 PROCEDURE — 81003 URINALYSIS AUTO W/O SCOPE: CPT

## 2019-01-01 PROCEDURE — 73700 CT LOWER EXTREMITY W/O DYE: CPT

## 2019-01-01 PROCEDURE — 82140 ASSAY OF AMMONIA: CPT

## 2019-01-01 PROCEDURE — 6360000004 HC RX CONTRAST MEDICATION: Performed by: PHYSICIAN ASSISTANT

## 2019-01-01 PROCEDURE — 85730 THROMBOPLASTIN TIME PARTIAL: CPT

## 2019-01-01 PROCEDURE — 80076 HEPATIC FUNCTION PANEL: CPT

## 2019-01-01 PROCEDURE — 73090 X-RAY EXAM OF FOREARM: CPT

## 2019-01-01 PROCEDURE — 76770 US EXAM ABDO BACK WALL COMP: CPT

## 2019-01-01 PROCEDURE — 80069 RENAL FUNCTION PANEL: CPT

## 2019-01-01 PROCEDURE — 96372 THER/PROPH/DIAG INJ SC/IM: CPT

## 2019-01-01 PROCEDURE — 2500000003 HC RX 250 WO HCPCS: Performed by: INTERNAL MEDICINE

## 2019-01-01 PROCEDURE — 72192 CT PELVIS W/O DYE: CPT

## 2019-01-01 PROCEDURE — 99284 EMERGENCY DEPT VISIT MOD MDM: CPT

## 2019-01-01 PROCEDURE — 84133 ASSAY OF URINE POTASSIUM: CPT

## 2019-01-01 PROCEDURE — 84155 ASSAY OF PROTEIN SERUM: CPT

## 2019-01-01 PROCEDURE — 2580000003 HC RX 258: Performed by: PHYSICIAN ASSISTANT

## 2019-01-01 PROCEDURE — 96374 THER/PROPH/DIAG INJ IV PUSH: CPT

## 2019-01-01 PROCEDURE — 87186 SC STD MICRODIL/AGAR DIL: CPT

## 2019-01-01 PROCEDURE — 74019 RADEX ABDOMEN 2 VIEWS: CPT

## 2019-01-01 RX ORDER — IPRATROPIUM BROMIDE AND ALBUTEROL SULFATE 2.5; .5 MG/3ML; MG/3ML
1 SOLUTION RESPIRATORY (INHALATION) ONCE
Status: COMPLETED | OUTPATIENT
Start: 2019-01-01 | End: 2019-01-01

## 2019-01-01 RX ORDER — 0.9 % SODIUM CHLORIDE 0.9 %
1000 INTRAVENOUS SOLUTION INTRAVENOUS ONCE
Status: COMPLETED | OUTPATIENT
Start: 2019-01-01 | End: 2019-01-01

## 2019-01-01 RX ORDER — ONDANSETRON 2 MG/ML
4 INJECTION INTRAMUSCULAR; INTRAVENOUS EVERY 6 HOURS PRN
Status: DISCONTINUED | OUTPATIENT
Start: 2019-01-01 | End: 2019-01-01 | Stop reason: HOSPADM

## 2019-01-01 RX ORDER — GABAPENTIN 300 MG/1
300 CAPSULE ORAL 3 TIMES DAILY
Status: DISCONTINUED | OUTPATIENT
Start: 2019-01-01 | End: 2019-01-01 | Stop reason: HOSPADM

## 2019-01-01 RX ORDER — FERROUS GLUCONATE 324(37.5)
324 TABLET ORAL
Status: DISCONTINUED | OUTPATIENT
Start: 2019-01-01 | End: 2019-01-01 | Stop reason: HOSPADM

## 2019-01-01 RX ORDER — SODIUM CHLORIDE 9 MG/ML
INJECTION, SOLUTION INTRAVENOUS CONTINUOUS
Status: DISCONTINUED | OUTPATIENT
Start: 2019-01-01 | End: 2019-01-01 | Stop reason: HOSPADM

## 2019-01-01 RX ORDER — LORAZEPAM 2 MG/ML
0.5 INJECTION INTRAMUSCULAR EVERY 4 HOURS PRN
Status: DISCONTINUED | OUTPATIENT
Start: 2019-01-01 | End: 2019-01-01 | Stop reason: HOSPADM

## 2019-01-01 RX ORDER — NYSTATIN 10B UNIT
POWDER (EA) MISCELLANEOUS EVERY 6 HOURS PRN
COMMUNITY

## 2019-01-01 RX ORDER — ALBUTEROL SULFATE 2.5 MG/3ML
5 SOLUTION RESPIRATORY (INHALATION) ONCE
Status: COMPLETED | OUTPATIENT
Start: 2019-01-01 | End: 2019-01-01

## 2019-01-01 RX ORDER — ATORVASTATIN CALCIUM 80 MG/1
80 TABLET, FILM COATED ORAL NIGHTLY
Status: DISCONTINUED | OUTPATIENT
Start: 2019-01-01 | End: 2019-01-01 | Stop reason: HOSPADM

## 2019-01-01 RX ORDER — WARFARIN SODIUM 6 MG/1
6.5 TABLET ORAL EVERY OTHER DAY
Status: ON HOLD | COMMUNITY
End: 2019-01-01 | Stop reason: HOSPADM

## 2019-01-01 RX ORDER — IPRATROPIUM BROMIDE AND ALBUTEROL SULFATE 2.5; .5 MG/3ML; MG/3ML
1 SOLUTION RESPIRATORY (INHALATION) EVERY 4 HOURS PRN
Status: DISCONTINUED | OUTPATIENT
Start: 2019-01-01 | End: 2019-01-01 | Stop reason: HOSPADM

## 2019-01-01 RX ORDER — LORAZEPAM 2 MG/ML
1 INJECTION INTRAMUSCULAR
Status: DISCONTINUED | OUTPATIENT
Start: 2019-01-01 | End: 2019-01-01 | Stop reason: HOSPADM

## 2019-01-01 RX ORDER — GABAPENTIN 300 MG/1
300 CAPSULE ORAL 4 TIMES DAILY
Status: DISCONTINUED | OUTPATIENT
Start: 2019-01-01 | End: 2019-01-01

## 2019-01-01 RX ORDER — LORAZEPAM 2 MG/ML
INJECTION INTRAMUSCULAR
Status: DISPENSED
Start: 2019-01-01 | End: 2019-01-01

## 2019-01-01 RX ORDER — ACETAMINOPHEN 325 MG/1
650 TABLET ORAL EVERY 6 HOURS PRN
Status: DISCONTINUED | OUTPATIENT
Start: 2019-01-01 | End: 2019-01-01 | Stop reason: HOSPADM

## 2019-01-01 RX ORDER — MORPHINE SULFATE 2 MG/ML
2 INJECTION, SOLUTION INTRAMUSCULAR; INTRAVENOUS
Status: DISCONTINUED | OUTPATIENT
Start: 2019-01-01 | End: 2019-01-01

## 2019-01-01 RX ORDER — LEVOTHYROXINE SODIUM 0.12 MG/1
125 TABLET ORAL DAILY
Qty: 30 TABLET | Refills: 3
Start: 2019-01-01

## 2019-01-01 RX ORDER — DIAPER,BRIEF,INFANT-TODD,DISP
EACH MISCELLANEOUS 2 TIMES DAILY PRN
COMMUNITY

## 2019-01-01 RX ORDER — ATORVASTATIN CALCIUM 80 MG/1
80 TABLET, FILM COATED ORAL DAILY
Status: DISCONTINUED | OUTPATIENT
Start: 2019-01-01 | End: 2019-01-01 | Stop reason: HOSPADM

## 2019-01-01 RX ORDER — LACTULOSE 10 G/15ML
20 SOLUTION ORAL
Status: DISCONTINUED | OUTPATIENT
Start: 2019-01-01 | End: 2019-01-01

## 2019-01-01 RX ORDER — 0.9 % SODIUM CHLORIDE 0.9 %
500 INTRAVENOUS SOLUTION INTRAVENOUS PRN
Status: DISCONTINUED | OUTPATIENT
Start: 2019-01-01 | End: 2019-01-01 | Stop reason: HOSPADM

## 2019-01-01 RX ORDER — SODIUM CHLORIDE 0.9 % (FLUSH) 0.9 %
10 SYRINGE (ML) INJECTION PRN
Status: DISCONTINUED | OUTPATIENT
Start: 2019-01-01 | End: 2019-01-01 | Stop reason: HOSPADM

## 2019-01-01 RX ORDER — HYDRALAZINE HYDROCHLORIDE 20 MG/ML
10 INJECTION INTRAMUSCULAR; INTRAVENOUS EVERY 6 HOURS PRN
Status: DISCONTINUED | OUTPATIENT
Start: 2019-01-01 | End: 2019-01-01 | Stop reason: HOSPADM

## 2019-01-01 RX ORDER — POTASSIUM CHLORIDE 750 MG/1
10 TABLET, FILM COATED, EXTENDED RELEASE ORAL DAILY
Status: DISCONTINUED | OUTPATIENT
Start: 2019-01-01 | End: 2019-01-01

## 2019-01-01 RX ORDER — LACTULOSE 10 G/15ML
20 SOLUTION ORAL 2 TIMES DAILY
Status: DISCONTINUED | OUTPATIENT
Start: 2019-01-01 | End: 2019-01-01 | Stop reason: HOSPADM

## 2019-01-01 RX ORDER — LACTULOSE 10 G/15ML
20 SOLUTION ORAL EVERY 6 HOURS SCHEDULED
Status: DISCONTINUED | OUTPATIENT
Start: 2019-01-01 | End: 2019-01-01 | Stop reason: HOSPADM

## 2019-01-01 RX ORDER — LEVOTHYROXINE SODIUM 0.12 MG/1
125 TABLET ORAL DAILY
Status: DISCONTINUED | OUTPATIENT
Start: 2019-01-01 | End: 2019-01-01 | Stop reason: HOSPADM

## 2019-01-01 RX ORDER — FAMOTIDINE 20 MG/1
20 TABLET, FILM COATED ORAL DAILY
Status: DISCONTINUED | OUTPATIENT
Start: 2019-01-01 | End: 2019-01-01 | Stop reason: HOSPADM

## 2019-01-01 RX ORDER — LORAZEPAM 2 MG/ML
1 INJECTION INTRAMUSCULAR EVERY 4 HOURS PRN
Status: DISCONTINUED | OUTPATIENT
Start: 2019-01-01 | End: 2019-01-01

## 2019-01-01 RX ORDER — LACTULOSE 10 G/15ML
20 SOLUTION ORAL 2 TIMES DAILY
Status: DISCONTINUED | OUTPATIENT
Start: 2019-01-01 | End: 2019-01-01

## 2019-01-01 RX ORDER — ACETAMINOPHEN 325 MG/1
650 TABLET ORAL EVERY 6 HOURS PRN
Status: DISCONTINUED | OUTPATIENT
Start: 2019-01-01 | End: 2019-01-01 | Stop reason: SINTOL

## 2019-01-01 RX ORDER — AMLODIPINE BESYLATE 5 MG/1
10 TABLET ORAL DAILY
Status: DISCONTINUED | OUTPATIENT
Start: 2019-01-01 | End: 2019-01-01 | Stop reason: HOSPADM

## 2019-01-01 RX ORDER — PREDNISONE 10 MG/1
20 TABLET ORAL DAILY
COMMUNITY
Start: 2019-01-01 | End: 2019-07-27

## 2019-01-01 RX ORDER — MECLIZINE HCL 12.5 MG/1
12.5 TABLET ORAL 3 TIMES DAILY PRN
Status: DISCONTINUED | OUTPATIENT
Start: 2019-01-01 | End: 2019-01-01 | Stop reason: HOSPADM

## 2019-01-01 RX ORDER — CIPROFLOXACIN 500 MG/1
500 TABLET, FILM COATED ORAL EVERY 12 HOURS SCHEDULED
Qty: 20 TABLET | Refills: 0
Start: 2019-01-01 | End: 2019-01-01

## 2019-01-01 RX ORDER — SODIUM CHLORIDE 9 MG/ML
INJECTION, SOLUTION INTRAVENOUS CONTINUOUS
Status: DISCONTINUED | OUTPATIENT
Start: 2019-01-01 | End: 2019-01-01

## 2019-01-01 RX ORDER — DIAPER,BRIEF,INFANT-TODD,DISP
EACH MISCELLANEOUS 2 TIMES DAILY
Status: DISCONTINUED | OUTPATIENT
Start: 2019-01-01 | End: 2019-01-01 | Stop reason: HOSPADM

## 2019-01-01 RX ORDER — TRAMADOL HYDROCHLORIDE 50 MG/1
25 TABLET ORAL 2 TIMES DAILY
COMMUNITY

## 2019-01-01 RX ORDER — POTASSIUM CHLORIDE 750 MG/1
10 TABLET, FILM COATED, EXTENDED RELEASE ORAL DAILY
Status: DISCONTINUED | OUTPATIENT
Start: 2019-01-01 | End: 2019-01-01 | Stop reason: HOSPADM

## 2019-01-01 RX ORDER — VANCOMYCIN HYDROCHLORIDE 1 G/200ML
1000 INJECTION, SOLUTION INTRAVENOUS ONCE
Status: DISCONTINUED | OUTPATIENT
Start: 2019-01-01 | End: 2019-01-01

## 2019-01-01 RX ORDER — LACTULOSE 10 G/15ML
20 SOLUTION ORAL 3 TIMES DAILY
Status: DISCONTINUED | OUTPATIENT
Start: 2019-01-01 | End: 2019-01-01 | Stop reason: HOSPADM

## 2019-01-01 RX ORDER — METHYLPREDNISOLONE SODIUM SUCCINATE 125 MG/2ML
125 INJECTION, POWDER, LYOPHILIZED, FOR SOLUTION INTRAMUSCULAR; INTRAVENOUS ONCE
Status: COMPLETED | OUTPATIENT
Start: 2019-01-01 | End: 2019-01-01

## 2019-01-01 RX ORDER — LACTULOSE 10 G/15ML
20 SOLUTION ORAL ONCE
Status: COMPLETED | OUTPATIENT
Start: 2019-01-01 | End: 2019-01-01

## 2019-01-01 RX ORDER — POTASSIUM CHLORIDE 7.45 MG/ML
10 INJECTION INTRAVENOUS PRN
Status: DISCONTINUED | OUTPATIENT
Start: 2019-01-01 | End: 2019-01-01 | Stop reason: HOSPADM

## 2019-01-01 RX ORDER — TRAMADOL HYDROCHLORIDE 50 MG/1
50 TABLET ORAL ONCE
Status: COMPLETED | OUTPATIENT
Start: 2019-01-01 | End: 2019-01-01

## 2019-01-01 RX ORDER — CLOPIDOGREL BISULFATE 75 MG/1
75 TABLET ORAL DAILY
Status: DISCONTINUED | OUTPATIENT
Start: 2019-01-01 | End: 2019-01-01 | Stop reason: HOSPADM

## 2019-01-01 RX ORDER — MORPHINE SULFATE 4 MG/ML
4 INJECTION, SOLUTION INTRAMUSCULAR; INTRAVENOUS
Status: DISCONTINUED | OUTPATIENT
Start: 2019-01-01 | End: 2019-01-01 | Stop reason: HOSPADM

## 2019-01-01 RX ORDER — WARFARIN SODIUM 6 MG/1
6.5 TABLET ORAL EVERY OTHER DAY
COMMUNITY
End: 2019-01-01 | Stop reason: ALTCHOICE

## 2019-01-01 RX ORDER — GUAIFENESIN 600 MG/1
1200 TABLET, EXTENDED RELEASE ORAL 2 TIMES DAILY
COMMUNITY

## 2019-01-01 RX ORDER — POTASSIUM CHLORIDE 20 MEQ/1
40 TABLET, EXTENDED RELEASE ORAL PRN
Status: DISCONTINUED | OUTPATIENT
Start: 2019-01-01 | End: 2019-01-01 | Stop reason: HOSPADM

## 2019-01-01 RX ORDER — AMLODIPINE BESYLATE 10 MG/1
10 TABLET ORAL DAILY
Qty: 30 TABLET | Refills: 3
Start: 2019-01-01

## 2019-01-01 RX ORDER — ONDANSETRON 4 MG/1
4 TABLET, ORALLY DISINTEGRATING ORAL ONCE
Status: COMPLETED | OUTPATIENT
Start: 2019-01-01 | End: 2019-01-01

## 2019-01-01 RX ORDER — WARFARIN SODIUM 6 MG/1
6 TABLET ORAL EVERY OTHER DAY
COMMUNITY
End: 2019-01-01 | Stop reason: ALTCHOICE

## 2019-01-01 RX ORDER — ACETAMINOPHEN 500 MG
1000 TABLET ORAL ONCE
Status: COMPLETED | OUTPATIENT
Start: 2019-01-01 | End: 2019-01-01

## 2019-01-01 RX ORDER — HEPARIN SODIUM 5000 [USP'U]/ML
5000 INJECTION, SOLUTION INTRAVENOUS; SUBCUTANEOUS EVERY 8 HOURS SCHEDULED
Status: DISCONTINUED | OUTPATIENT
Start: 2019-01-01 | End: 2019-01-01 | Stop reason: SDUPTHER

## 2019-01-01 RX ORDER — LOSARTAN POTASSIUM 100 MG/1
100 TABLET ORAL DAILY
Status: DISCONTINUED | OUTPATIENT
Start: 2019-01-01 | End: 2019-01-01

## 2019-01-01 RX ORDER — ACETAMINOPHEN 325 MG/1
650 TABLET ORAL EVERY 6 HOURS PRN
COMMUNITY

## 2019-01-01 RX ORDER — CIPROFLOXACIN 500 MG/1
500 TABLET, FILM COATED ORAL EVERY 12 HOURS SCHEDULED
Status: DISCONTINUED | OUTPATIENT
Start: 2019-01-01 | End: 2019-01-01 | Stop reason: HOSPADM

## 2019-01-01 RX ORDER — CEFTRIAXONE 2 G/1
1 INJECTION, POWDER, FOR SOLUTION INTRAMUSCULAR; INTRAVENOUS DAILY
COMMUNITY
Start: 2019-01-01 | End: 2019-07-28

## 2019-01-01 RX ORDER — GLUCOSAMINE SULFATE 500 MG
500 CAPSULE ORAL DAILY
COMMUNITY

## 2019-01-01 RX ORDER — FUROSEMIDE 20 MG/1
20 TABLET ORAL DAILY
COMMUNITY
Start: 2019-01-01 | End: 2019-07-26

## 2019-01-01 RX ORDER — SODIUM CHLORIDE 0.9 % (FLUSH) 0.9 %
10 SYRINGE (ML) INJECTION EVERY 12 HOURS SCHEDULED
Status: DISCONTINUED | OUTPATIENT
Start: 2019-01-01 | End: 2019-01-01 | Stop reason: HOSPADM

## 2019-01-01 RX ORDER — GABAPENTIN 300 MG/1
300 CAPSULE ORAL 4 TIMES DAILY
Status: DISCONTINUED | OUTPATIENT
Start: 2019-01-01 | End: 2019-01-01 | Stop reason: HOSPADM

## 2019-01-01 RX ORDER — GABAPENTIN 300 MG/1
300 CAPSULE ORAL 4 TIMES DAILY
Status: DISCONTINUED | OUTPATIENT
Start: 2019-01-01 | End: 2019-01-01 | Stop reason: ALTCHOICE

## 2019-01-01 RX ORDER — AMLODIPINE BESYLATE 5 MG/1
5 TABLET ORAL DAILY
Status: DISCONTINUED | OUTPATIENT
Start: 2019-01-01 | End: 2019-01-01

## 2019-01-01 RX ORDER — FAMOTIDINE 20 MG/1
20 TABLET, FILM COATED ORAL DAILY
COMMUNITY

## 2019-01-01 RX ORDER — DOCUSATE SODIUM 100 MG/1
100 CAPSULE, LIQUID FILLED ORAL 2 TIMES DAILY
Status: DISCONTINUED | OUTPATIENT
Start: 2019-01-01 | End: 2019-01-01 | Stop reason: HOSPADM

## 2019-01-01 RX ORDER — GABAPENTIN 100 MG/1
100 CAPSULE ORAL 3 TIMES DAILY
Status: DISCONTINUED | OUTPATIENT
Start: 2019-01-01 | End: 2019-01-01 | Stop reason: HOSPADM

## 2019-01-01 RX ORDER — POTASSIUM CHLORIDE 750 MG/1
10 TABLET, FILM COATED, EXTENDED RELEASE ORAL DAILY
COMMUNITY

## 2019-01-01 RX ORDER — IPRATROPIUM BROMIDE AND ALBUTEROL SULFATE 2.5; .5 MG/3ML; MG/3ML
1 SOLUTION RESPIRATORY (INHALATION) EVERY 6 HOURS PRN
COMMUNITY

## 2019-01-01 RX ORDER — 0.9 % SODIUM CHLORIDE 0.9 %
500 INTRAVENOUS SOLUTION INTRAVENOUS ONCE
Status: COMPLETED | OUTPATIENT
Start: 2019-01-01 | End: 2019-01-01

## 2019-01-01 RX ADMIN — HYDROCORTISONE: 1 CREAM TOPICAL at 20:47

## 2019-01-01 RX ADMIN — ATORVASTATIN CALCIUM 80 MG: 80 TABLET, FILM COATED ORAL at 20:44

## 2019-01-01 RX ADMIN — Medication 10 ML: at 22:08

## 2019-01-01 RX ADMIN — SODIUM CHLORIDE: 9 INJECTION, SOLUTION INTRAVENOUS at 23:00

## 2019-01-01 RX ADMIN — ONDANSETRON 4 MG: 4 TABLET, ORALLY DISINTEGRATING ORAL at 10:18

## 2019-01-01 RX ADMIN — VITAMIN D, TAB 1000IU (100/BT) 1000 UNITS: 25 TAB at 08:04

## 2019-01-01 RX ADMIN — GABAPENTIN 300 MG: 300 CAPSULE ORAL at 02:58

## 2019-01-01 RX ADMIN — HYDROCORTISONE: 1 CREAM TOPICAL at 02:57

## 2019-01-01 RX ADMIN — HYDROCORTISONE: 1 CREAM TOPICAL at 11:45

## 2019-01-01 RX ADMIN — AMLODIPINE BESYLATE 5 MG: 5 TABLET ORAL at 10:15

## 2019-01-01 RX ADMIN — CEFEPIME 1 G: 1 INJECTION, POWDER, FOR SOLUTION INTRAMUSCULAR; INTRAVENOUS at 17:44

## 2019-01-01 RX ADMIN — RIFAXIMIN 550 MG: 550 TABLET ORAL at 09:53

## 2019-01-01 RX ADMIN — VANCOMYCIN HYDROCHLORIDE 1250 MG: 10 INJECTION, POWDER, LYOPHILIZED, FOR SOLUTION INTRAVENOUS at 17:45

## 2019-01-01 RX ADMIN — RIFAXIMIN 550 MG: 550 TABLET ORAL at 22:25

## 2019-01-01 RX ADMIN — LORAZEPAM 1 MG: 2 INJECTION INTRAMUSCULAR; INTRAVENOUS at 05:08

## 2019-01-01 RX ADMIN — ACETAMINOPHEN 650 MG: 325 TABLET, FILM COATED ORAL at 18:23

## 2019-01-01 RX ADMIN — FAMOTIDINE 20 MG: 20 TABLET ORAL at 08:04

## 2019-01-01 RX ADMIN — LACTULOSE 20 G: 20 SOLUTION ORAL at 08:26

## 2019-01-01 RX ADMIN — SODIUM CHLORIDE: 9 INJECTION, SOLUTION INTRAVENOUS at 02:45

## 2019-01-01 RX ADMIN — SODIUM CHLORIDE: 9 INJECTION, SOLUTION INTRAVENOUS at 17:57

## 2019-01-01 RX ADMIN — Medication 1 G: at 22:03

## 2019-01-01 RX ADMIN — DOCUSATE SODIUM 100 MG: 100 CAPSULE, LIQUID FILLED ORAL at 21:05

## 2019-01-01 RX ADMIN — AMLODIPINE BESYLATE 10 MG: 5 TABLET ORAL at 19:13

## 2019-01-01 RX ADMIN — GABAPENTIN 300 MG: 300 CAPSULE ORAL at 20:44

## 2019-01-01 RX ADMIN — LEVOTHYROXINE SODIUM 125 MCG: 125 TABLET ORAL at 06:45

## 2019-01-01 RX ADMIN — Medication 1 G: at 22:32

## 2019-01-01 RX ADMIN — LACTULOSE 20 G: 20 SOLUTION ORAL at 11:48

## 2019-01-01 RX ADMIN — FAMOTIDINE 20 MG: 20 TABLET ORAL at 08:26

## 2019-01-01 RX ADMIN — IPRATROPIUM BROMIDE AND ALBUTEROL SULFATE 1 AMPULE: .5; 3 SOLUTION RESPIRATORY (INHALATION) at 04:27

## 2019-01-01 RX ADMIN — HYDROCORTISONE: 1 CREAM TOPICAL at 09:28

## 2019-01-01 RX ADMIN — POTASSIUM CHLORIDE 10 MEQ: 750 TABLET, FILM COATED, EXTENDED RELEASE ORAL at 10:17

## 2019-01-01 RX ADMIN — AMLODIPINE BESYLATE 10 MG: 5 TABLET ORAL at 11:44

## 2019-01-01 RX ADMIN — ACETAMINOPHEN 650 MG: 325 TABLET, FILM COATED ORAL at 01:24

## 2019-01-01 RX ADMIN — CLOPIDOGREL 75 MG: 75 TABLET, FILM COATED ORAL at 08:25

## 2019-01-01 RX ADMIN — MORPHINE SULFATE 4 MG: 4 INJECTION INTRAVENOUS at 01:30

## 2019-01-01 RX ADMIN — Medication 10 ML: at 09:56

## 2019-01-01 RX ADMIN — FAMOTIDINE 20 MG: 20 TABLET, FILM COATED ORAL at 11:44

## 2019-01-01 RX ADMIN — CLOPIDOGREL 75 MG: 75 TABLET, FILM COATED ORAL at 11:44

## 2019-01-01 RX ADMIN — RIFAXIMIN 550 MG: 550 TABLET ORAL at 22:21

## 2019-01-01 RX ADMIN — ONDANSETRON 4 MG: 2 INJECTION INTRAMUSCULAR; INTRAVENOUS at 20:40

## 2019-01-01 RX ADMIN — IPRATROPIUM BROMIDE AND ALBUTEROL SULFATE 1 AMPULE: .5; 3 SOLUTION RESPIRATORY (INHALATION) at 16:34

## 2019-01-01 RX ADMIN — MICONAZOLE NITRATE: 20 POWDER TOPICAL at 10:18

## 2019-01-01 RX ADMIN — POTASSIUM CHLORIDE 10 MEQ: 750 TABLET, FILM COATED, EXTENDED RELEASE ORAL at 19:13

## 2019-01-01 RX ADMIN — LACTULOSE 20 G: 20 SOLUTION ORAL at 21:32

## 2019-01-01 RX ADMIN — ATORVASTATIN CALCIUM 80 MG: 80 TABLET, FILM COATED ORAL at 02:58

## 2019-01-01 RX ADMIN — SODIUM CHLORIDE: 9 INJECTION, SOLUTION INTRAVENOUS at 10:48

## 2019-01-01 RX ADMIN — SODIUM CHLORIDE: 9 INJECTION, SOLUTION INTRAVENOUS at 02:57

## 2019-01-01 RX ADMIN — RIFAXIMIN 550 MG: 550 TABLET ORAL at 08:41

## 2019-01-01 RX ADMIN — CLOPIDOGREL 75 MG: 75 TABLET, FILM COATED ORAL at 08:03

## 2019-01-01 RX ADMIN — SODIUM CHLORIDE: 9 INJECTION, SOLUTION INTRAVENOUS at 19:07

## 2019-01-01 RX ADMIN — GABAPENTIN 100 MG: 300 CAPSULE ORAL at 16:37

## 2019-01-01 RX ADMIN — FAMOTIDINE 20 MG: 20 TABLET, FILM COATED ORAL at 10:18

## 2019-01-01 RX ADMIN — GABAPENTIN 300 MG: 300 CAPSULE ORAL at 11:44

## 2019-01-01 RX ADMIN — FERROUS GLUCONATE TAB 324 MG (37.5 MG ELEMENTAL IRON) 324 MG: 324 (37.5 FE) TAB at 09:26

## 2019-01-01 RX ADMIN — FAMOTIDINE 20 MG: 20 TABLET, FILM COATED ORAL at 09:26

## 2019-01-01 RX ADMIN — HYDROCORTISONE: 1 CREAM TOPICAL at 10:18

## 2019-01-01 RX ADMIN — RIFAXIMIN 550 MG: 550 TABLET ORAL at 20:40

## 2019-01-01 RX ADMIN — Medication 10 ML: at 11:48

## 2019-01-01 RX ADMIN — MORPHINE SULFATE 2 MG: 2 INJECTION, SOLUTION INTRAMUSCULAR; INTRAVENOUS at 14:03

## 2019-01-01 RX ADMIN — MICONAZOLE NITRATE: 20 POWDER TOPICAL at 20:40

## 2019-01-01 RX ADMIN — CEFEPIME HYDROCHLORIDE 1 G: 1 INJECTION, POWDER, FOR SOLUTION INTRAMUSCULAR; INTRAVENOUS at 17:06

## 2019-01-01 RX ADMIN — LEVOTHYROXINE SODIUM 125 MCG: 125 TABLET ORAL at 05:41

## 2019-01-01 RX ADMIN — HYDROCORTISONE: 1 CREAM TOPICAL at 09:27

## 2019-01-01 RX ADMIN — MORPHINE SULFATE 2 MG: 2 INJECTION, SOLUTION INTRAMUSCULAR; INTRAVENOUS at 13:03

## 2019-01-01 RX ADMIN — POTASSIUM CHLORIDE 10 MEQ: 750 TABLET, FILM COATED, EXTENDED RELEASE ORAL at 11:44

## 2019-01-01 RX ADMIN — METHYLPREDNISOLONE SODIUM SUCCINATE 125 MG: 125 INJECTION, POWDER, FOR SOLUTION INTRAMUSCULAR; INTRAVENOUS at 16:29

## 2019-01-01 RX ADMIN — SODIUM CHLORIDE 1000 ML: 9 INJECTION, SOLUTION INTRAVENOUS at 19:18

## 2019-01-01 RX ADMIN — RIFAXIMIN 550 MG: 550 TABLET ORAL at 21:18

## 2019-01-01 RX ADMIN — SODIUM CHLORIDE 1000 ML: 9 INJECTION, SOLUTION INTRAVENOUS at 17:05

## 2019-01-01 RX ADMIN — GABAPENTIN 300 MG: 300 CAPSULE ORAL at 18:02

## 2019-01-01 RX ADMIN — DOCUSATE SODIUM 100 MG: 100 CAPSULE, LIQUID FILLED ORAL at 09:56

## 2019-01-01 RX ADMIN — VITAMIN D, TAB 1000IU (100/BT) 1000 UNITS: 25 TAB at 08:25

## 2019-01-01 RX ADMIN — MORPHINE SULFATE 2 MG: 2 INJECTION, SOLUTION INTRAMUSCULAR; INTRAVENOUS at 10:31

## 2019-01-01 RX ADMIN — LEVOTHYROXINE SODIUM 125 MCG: 125 TABLET ORAL at 10:13

## 2019-01-01 RX ADMIN — LACTULOSE 20 G: 20 SOLUTION ORAL at 05:51

## 2019-01-01 RX ADMIN — LACTULOSE 20 G: 20 SOLUTION ORAL at 00:15

## 2019-01-01 RX ADMIN — ACETAMINOPHEN 650 MG: 325 TABLET, FILM COATED ORAL at 08:03

## 2019-01-01 RX ADMIN — LACTULOSE 20 G: 20 SOLUTION ORAL at 21:05

## 2019-01-01 RX ADMIN — CLOPIDOGREL 75 MG: 75 TABLET, FILM COATED ORAL at 09:52

## 2019-01-01 RX ADMIN — GABAPENTIN 300 MG: 300 CAPSULE ORAL at 20:40

## 2019-01-01 RX ADMIN — VITAMIN D, TAB 1000IU (100/BT) 1000 UNITS: 25 TAB at 10:18

## 2019-01-01 RX ADMIN — CLOPIDOGREL 75 MG: 75 TABLET, FILM COATED ORAL at 10:18

## 2019-01-01 RX ADMIN — VANCOMYCIN HYDROCHLORIDE 1250 MG: 10 INJECTION, POWDER, LYOPHILIZED, FOR SOLUTION INTRAVENOUS at 21:00

## 2019-01-01 RX ADMIN — AMLODIPINE BESYLATE 10 MG: 5 TABLET ORAL at 08:25

## 2019-01-01 RX ADMIN — AMLODIPINE BESYLATE 5 MG: 5 TABLET ORAL at 08:03

## 2019-01-01 RX ADMIN — RIFAXIMIN 550 MG: 550 TABLET ORAL at 10:18

## 2019-01-01 RX ADMIN — LACTULOSE 20 G: 20 SOLUTION ORAL at 21:18

## 2019-01-01 RX ADMIN — GABAPENTIN 100 MG: 300 CAPSULE ORAL at 13:08

## 2019-01-01 RX ADMIN — WARFARIN SODIUM 6.5 MG: 2.5 TABLET ORAL at 18:19

## 2019-01-01 RX ADMIN — MORPHINE SULFATE 4 MG: 4 INJECTION INTRAVENOUS at 21:56

## 2019-01-01 RX ADMIN — Medication 10 ML: at 13:04

## 2019-01-01 RX ADMIN — GABAPENTIN 100 MG: 300 CAPSULE ORAL at 21:18

## 2019-01-01 RX ADMIN — IOPAMIDOL 75 ML: 755 INJECTION, SOLUTION INTRAVENOUS at 21:17

## 2019-01-01 RX ADMIN — RIFAXIMIN 550 MG: 550 TABLET ORAL at 08:03

## 2019-01-01 RX ADMIN — POTASSIUM CHLORIDE 10 MEQ: 750 TABLET, FILM COATED, EXTENDED RELEASE ORAL at 09:26

## 2019-01-01 RX ADMIN — GABAPENTIN 300 MG: 300 CAPSULE ORAL at 09:26

## 2019-01-01 RX ADMIN — RIFAXIMIN 550 MG: 550 TABLET ORAL at 02:58

## 2019-01-01 RX ADMIN — GABAPENTIN 300 MG: 300 CAPSULE ORAL at 10:17

## 2019-01-01 RX ADMIN — DOCUSATE SODIUM 100 MG: 100 CAPSULE, LIQUID FILLED ORAL at 08:03

## 2019-01-01 RX ADMIN — MICONAZOLE NITRATE: 20 POWDER TOPICAL at 20:47

## 2019-01-01 RX ADMIN — LEVOTHYROXINE SODIUM 125 MCG: 125 TABLET ORAL at 09:26

## 2019-01-01 RX ADMIN — LORAZEPAM 1 MG: 2 INJECTION INTRAMUSCULAR; INTRAVENOUS at 09:28

## 2019-01-01 RX ADMIN — FERROUS GLUCONATE TAB 324 MG (37.5 MG ELEMENTAL IRON) 324 MG: 324 (37.5 FE) TAB at 08:04

## 2019-01-01 RX ADMIN — LEVOTHYROXINE SODIUM 125 MCG: 125 TABLET ORAL at 05:34

## 2019-01-01 RX ADMIN — RIFAXIMIN 550 MG: 550 TABLET ORAL at 20:35

## 2019-01-01 RX ADMIN — GABAPENTIN 300 MG: 300 CAPSULE ORAL at 20:35

## 2019-01-01 RX ADMIN — CLOPIDOGREL 75 MG: 75 TABLET, FILM COATED ORAL at 09:26

## 2019-01-01 RX ADMIN — FERROUS GLUCONATE TAB 324 MG (37.5 MG ELEMENTAL IRON) 324 MG: 324 (37.5 FE) TAB at 08:25

## 2019-01-01 RX ADMIN — FERROUS GLUCONATE TAB 324 MG (37.5 MG ELEMENTAL IRON) 324 MG: 324 (37.5 FE) TAB at 10:15

## 2019-01-01 RX ADMIN — GABAPENTIN 100 MG: 300 CAPSULE ORAL at 08:03

## 2019-01-01 RX ADMIN — ACETAMINOPHEN 650 MG: 325 TABLET, FILM COATED ORAL at 22:31

## 2019-01-01 RX ADMIN — Medication 10 ML: at 10:18

## 2019-01-01 RX ADMIN — Medication 10 ML: at 21:10

## 2019-01-01 RX ADMIN — Medication 1 G: at 22:08

## 2019-01-01 RX ADMIN — GABAPENTIN 100 MG: 300 CAPSULE ORAL at 08:25

## 2019-01-01 RX ADMIN — AMLODIPINE BESYLATE 10 MG: 5 TABLET ORAL at 09:26

## 2019-01-01 RX ADMIN — ACETAMINOPHEN 1000 MG: 500 TABLET, FILM COATED ORAL at 17:28

## 2019-01-01 RX ADMIN — FERROUS GLUCONATE TAB 324 MG (37.5 MG ELEMENTAL IRON) 324 MG: 324 (37.5 FE) TAB at 10:18

## 2019-01-01 RX ADMIN — GABAPENTIN 300 MG: 300 CAPSULE ORAL at 12:57

## 2019-01-01 RX ADMIN — Medication 10 ML: at 16:48

## 2019-01-01 RX ADMIN — ATORVASTATIN CALCIUM 80 MG: 80 TABLET, FILM COATED ORAL at 20:40

## 2019-01-01 RX ADMIN — LEVOTHYROXINE SODIUM 125 MCG: 125 TABLET ORAL at 05:51

## 2019-01-01 RX ADMIN — HYDROCORTISONE: 1 CREAM TOPICAL at 20:40

## 2019-01-01 RX ADMIN — FAMOTIDINE 20 MG: 20 TABLET ORAL at 09:53

## 2019-01-01 RX ADMIN — VITAMIN D 1000 UNITS: 25 TAB ORAL at 19:13

## 2019-01-01 RX ADMIN — LACTULOSE 20 G: 20 SOLUTION ORAL at 23:57

## 2019-01-01 RX ADMIN — RIFAXIMIN 550 MG: 550 TABLET ORAL at 11:44

## 2019-01-01 RX ADMIN — ONDANSETRON 4 MG: 2 INJECTION INTRAMUSCULAR; INTRAVENOUS at 14:19

## 2019-01-01 RX ADMIN — TRAMADOL HYDROCHLORIDE 50 MG: 50 TABLET, FILM COATED ORAL at 17:29

## 2019-01-01 RX ADMIN — FERROUS GLUCONATE TAB 324 MG (37.5 MG ELEMENTAL IRON) 324 MG: 324 (37.5 FE) TAB at 11:45

## 2019-01-01 RX ADMIN — MICONAZOLE NITRATE: 20 POWDER TOPICAL at 09:28

## 2019-01-01 RX ADMIN — ACETAMINOPHEN 650 MG: 325 TABLET, FILM COATED ORAL at 10:57

## 2019-01-01 RX ADMIN — DOCUSATE SODIUM 100 MG: 100 CAPSULE, LIQUID FILLED ORAL at 08:25

## 2019-01-01 RX ADMIN — SODIUM CHLORIDE 500 ML: 9 INJECTION, SOLUTION INTRAVENOUS at 23:30

## 2019-01-01 RX ADMIN — MORPHINE SULFATE 2 MG: 2 INJECTION, SOLUTION INTRAMUSCULAR; INTRAVENOUS at 15:50

## 2019-01-01 RX ADMIN — LACTULOSE 20 G: 20 SOLUTION ORAL at 09:27

## 2019-01-01 RX ADMIN — MORPHINE SULFATE 2 MG: 2 INJECTION, SOLUTION INTRAMUSCULAR; INTRAVENOUS at 11:47

## 2019-01-01 RX ADMIN — CEFEPIME 1 G: 1 INJECTION, POWDER, FOR SOLUTION INTRAMUSCULAR; INTRAVENOUS at 05:10

## 2019-01-01 RX ADMIN — GABAPENTIN 100 MG: 300 CAPSULE ORAL at 09:54

## 2019-01-01 RX ADMIN — AMLODIPINE BESYLATE 10 MG: 5 TABLET ORAL at 10:17

## 2019-01-01 RX ADMIN — DOCUSATE SODIUM 100 MG: 100 CAPSULE, LIQUID FILLED ORAL at 21:18

## 2019-01-01 RX ADMIN — LACTULOSE 20 G: 20 SOLUTION ORAL at 16:38

## 2019-01-01 RX ADMIN — GABAPENTIN 300 MG: 300 CAPSULE ORAL at 22:25

## 2019-01-01 RX ADMIN — GABAPENTIN 100 MG: 300 CAPSULE ORAL at 21:05

## 2019-01-01 RX ADMIN — RIFAXIMIN 550 MG: 550 TABLET ORAL at 21:06

## 2019-01-01 RX ADMIN — LACTULOSE 20 G: 20 SOLUTION ORAL at 05:41

## 2019-01-01 RX ADMIN — LACTULOSE 20 G: 20 SOLUTION ORAL at 18:02

## 2019-01-01 RX ADMIN — LACTULOSE 20 G: 20 SOLUTION ORAL at 18:19

## 2019-01-01 RX ADMIN — VITAMIN D, TAB 1000IU (100/BT) 1000 UNITS: 25 TAB at 11:44

## 2019-01-01 RX ADMIN — GABAPENTIN 300 MG: 300 CAPSULE ORAL at 18:19

## 2019-01-01 RX ADMIN — WARFARIN SODIUM 6.5 MG: 2.5 TABLET ORAL at 18:02

## 2019-01-01 RX ADMIN — CIPROFLOXACIN HYDROCHLORIDE 500 MG: 500 TABLET, FILM COATED ORAL at 10:17

## 2019-01-01 RX ADMIN — VITAMIN D, TAB 1000IU (100/BT) 1000 UNITS: 25 TAB at 09:53

## 2019-01-01 RX ADMIN — Medication 10 ML: at 20:41

## 2019-01-01 RX ADMIN — WARFARIN SODIUM 6.5 MG: 2.5 TABLET ORAL at 02:58

## 2019-01-01 RX ADMIN — LACTULOSE 20 G: 20 SOLUTION ORAL at 09:52

## 2019-01-01 RX ADMIN — LACTULOSE 20 G: 20 SOLUTION ORAL at 08:03

## 2019-01-01 RX ADMIN — VITAMIN D, TAB 1000IU (100/BT) 1000 UNITS: 25 TAB at 09:26

## 2019-01-01 RX ADMIN — ATORVASTATIN CALCIUM 80 MG: 80 TABLET, FILM COATED ORAL at 20:35

## 2019-01-01 RX ADMIN — ALBUTEROL SULFATE 5 MG: 2.5 SOLUTION RESPIRATORY (INHALATION) at 16:34

## 2019-01-01 RX ADMIN — Medication 10 ML: at 14:19

## 2019-01-01 RX ADMIN — SODIUM CHLORIDE: 9 INJECTION, SOLUTION INTRAVENOUS at 16:48

## 2019-01-01 RX ADMIN — Medication 10 ML: at 11:45

## 2019-01-01 RX ADMIN — RIFAXIMIN 550 MG: 550 TABLET ORAL at 09:26

## 2019-01-01 ASSESSMENT — PAIN SCALES - GENERAL
PAINLEVEL_OUTOF10: 0
PAINLEVEL_OUTOF10: 2
PAINLEVEL_OUTOF10: 0
PAINLEVEL_OUTOF10: 7
PAINLEVEL_OUTOF10: 0
PAINLEVEL_OUTOF10: 5
PAINLEVEL_OUTOF10: 7
PAINLEVEL_OUTOF10: 0
PAINLEVEL_OUTOF10: 7
PAINLEVEL_OUTOF10: 3
PAINLEVEL_OUTOF10: 8
PAINLEVEL_OUTOF10: 0
PAINLEVEL_OUTOF10: 3
PAINLEVEL_OUTOF10: 2
PAINLEVEL_OUTOF10: 0
PAINLEVEL_OUTOF10: 0
PAINLEVEL_OUTOF10: 2
PAINLEVEL_OUTOF10: 7
PAINLEVEL_OUTOF10: 0
PAINLEVEL_OUTOF10: 8
PAINLEVEL_OUTOF10: 0
PAINLEVEL_OUTOF10: 7
PAINLEVEL_OUTOF10: 2

## 2019-01-01 ASSESSMENT — PAIN DESCRIPTION - PAIN TYPE
TYPE: ACUTE PAIN

## 2019-01-01 ASSESSMENT — PAIN DESCRIPTION - ORIENTATION
ORIENTATION: LEFT
ORIENTATION: LEFT;ANTERIOR
ORIENTATION: LEFT
ORIENTATION: LEFT

## 2019-01-01 ASSESSMENT — PAIN SCALES - PAIN ASSESSMENT IN ADVANCED DEMENTIA (PAINAD)
CONSOLABILITY: 0
TOTALSCORE: 4
FACIALEXPRESSION: 2
NEGVOCALIZATION: 2
BREATHING: 0
CONSOLABILITY: 0
NEGVOCALIZATION: 0
TOTALSCORE: 2
BODYLANGUAGE: 0
BREATHING: 0
BODYLANGUAGE: 1
FACIALEXPRESSION: 1

## 2019-01-01 ASSESSMENT — ENCOUNTER SYMPTOMS
COUGH: 1
ABDOMINAL PAIN: 0
COUGH: 0
DIARRHEA: 0
RHINORRHEA: 0
SORE THROAT: 0
NAUSEA: 0
CHEST TIGHTNESS: 1
VOMITING: 0
BACK PAIN: 1
BACK PAIN: 1
WHEEZING: 0
VOMITING: 0
NAUSEA: 0
VOMITING: 0
ABDOMINAL PAIN: 0
NAUSEA: 0
ABDOMINAL PAIN: 0
RHINORRHEA: 0
EYE ITCHING: 0
SHORTNESS OF BREATH: 0
COUGH: 0
DIARRHEA: 0
SHORTNESS OF BREATH: 0
BLOOD IN STOOL: 0
VOMITING: 0
NAUSEA: 0
WHEEZING: 1
CONSTIPATION: 0
SHORTNESS OF BREATH: 0
SHORTNESS OF BREATH: 1
DIARRHEA: 0

## 2019-01-01 ASSESSMENT — PAIN DESCRIPTION - LOCATION
LOCATION: LEG;HIP
LOCATION: HIP;LEG
LOCATION: HEAD
LOCATION: HIP;LEG

## 2019-01-01 ASSESSMENT — PAIN DESCRIPTION - PROGRESSION
CLINICAL_PROGRESSION: RESOLVED

## 2019-01-01 ASSESSMENT — PAIN DESCRIPTION - FREQUENCY
FREQUENCY: INTERMITTENT
FREQUENCY: CONTINUOUS

## 2019-01-01 ASSESSMENT — PAIN DESCRIPTION - ONSET
ONSET: GRADUAL

## 2019-01-01 ASSESSMENT — PAIN DESCRIPTION - DESCRIPTORS
DESCRIPTORS: ACHING
DESCRIPTORS: HEADACHE

## 2019-01-01 ASSESSMENT — PAIN SCALES - WONG BAKER: WONGBAKER_NUMERICALRESPONSE: 2

## 2019-01-01 ASSESSMENT — PAIN - FUNCTIONAL ASSESSMENT: PAIN_FUNCTIONAL_ASSESSMENT: PREVENTS OR INTERFERES SOME ACTIVE ACTIVITIES AND ADLS

## 2019-05-31 NOTE — ED NOTES
Report called to Fritz Jimenez at [facility]. V/u, denies questions at this time. Pt d/c instructions given to daughter, v/u. IV removed without complications. A&O with no signs of distress. Denies needs at this time. Pt wheeled to exit.         Gualebrto Vazquez, RN  05/31/19 3642

## 2019-05-31 NOTE — ED PROVIDER NOTES
[facility]        Pt Name: Emilia Fraser  MRN: [id number]  [place] 1934  Date of evaluation: 5/31/2019  Provider: GONSALO Chinchilla - CNP  PCP: Phil Dueñas MD      [address] [place] [address]       Chief Complaint   Patient presents with   [facility] Fall     pt brought in by [facility] from [facility]. Pt c/o headache after a witness mechanical fall this am at appox 0830. Did hit forehead. Denies LOC. Pt was monitored at facility and given tyelonel for pain. Pt is on blood thinners . At appox 1400 [facility] called 911 due to pt reporting a headache. Pt hx of Dementia. Squad and family reports pt at baseline. A&O to self. HISTORY OF PRESENT ILLNESS   (Location/Symptom, Timing/Onset,Context/Setting, Quality, Duration, Modifying Factors, Severity)  Note limiting factors. Emilia Fraser is a 80 y.o. female who presents to ER with concern for evaluation after fall. She lives at a nursing home. She fell this morning and hit her head. She is anticoagulated on Coumadin. She denies fever, rash, headaches, dizziness, chest pain, shortness of breath, cough, congestion, abdominal pain, nausea, vomiting, diarrhea, constipation, blood in the stool, or painful urination. One friend/family member at bedside. Nursing Notes triage note reviewed and agreed with or any disagreements were addressed  in the HPI. REVIEW OF SYSTEMS    (2-9 systems for level 4, 10 or more for level 5)     Review of Systems   Constitutional: Negative for chills and fever. HENT: Negative for postnasal drip, rhinorrhea and sore throat. Eyes: Negative for visual disturbance. Respiratory: Negative for shortness of breath. Cardiovascular: Negative for chest pain. Gastrointestinal: Negative for abdominal pain, blood in stool, constipation, diarrhea, nausea and vomiting. Genitourinary: Negative for dysuria, flank pain and hematuria.    Musculoskeletal: Positive for neck pain. Skin: Negative for rash. Neurological: Negative for weakness and headaches. All other systems reviewed and are negative. Positives and Pertinent negatives as per HPI. Except as noted above in the ROS, all other systems were reviewed and negative. PAST MEDICAL HISTORY     Past Medical History:   Diagnosis Date    Anemia     Hyperlipidemia     Hypertension     Thyroid disease     Unspecified cerebral artery occlusion with cerebral infarction          SURGICAL HISTORY       Past Surgical History:   Procedure Laterality Date    CHOLECYSTECTOMY      HYSTERECTOMY           CURRENT MEDICATIONS       Previous Medications    ATORVASTATIN (LIPITOR) 80 MG TABLET    Take 80 mg by mouth daily    CLOPIDOGREL (PLAVIX) 75 MG TABLET    Take 75 mg by mouth daily    FERROUS GLUCONATE (FERGON) 324 (38 FE) MG TABLET    Take 324 mg by mouth daily (with breakfast)    GABAPENTIN (NEURONTIN) 300 MG CAPSULE    Take 300 mg by mouth 4 times daily    LACTULOSE (CHRONULAC) 10 GM/15ML SOLUTION    Take 30 mLs by mouth 2 times daily    LEVOTHYROXINE (SYNTHROID) 150 MCG TABLET    Take 150 mcg by mouth Daily    LOSARTAN-HYDROCHLOROTHIAZIDE (HYZAAR) 100-12.5 MG PER TABLET    Take 1 tablet by mouth daily    MECLIZINE (ANTIVERT) 12.5 MG TABLET    Take 12.5 mg by mouth 3 times daily as needed    RIFAXIMIN (XIFAXAN) 550 MG TABLET    Take 1 tablet by mouth 2 times daily    WARFARIN (COUMADIN) 5 MG TABLET    Take 5 mg by mouth         ALLERGIES     Patient has no known allergies. FAMILY HISTORY     History reviewed. No pertinent family history.        SOCIAL HISTORY       Social History     Socioeconomic History    Marital status:      Spouse name: None    Number of children: None    Years of education: None    Highest education level: None   Occupational History    None   Social Needs    Financial resource strain: None    Food insecurity:     Worry: None     Inability: None    Transportation needs: Medical: None     Non-medical: None   Tobacco Use    Smoking status: Never Smoker    Smokeless tobacco: Never Used   Substance and Sexual Activity    Alcohol use: No    Drug use: No    Sexual activity: None   Lifestyle    Physical activity:     Days per week: None     Minutes per session: None    Stress: None   Relationships    Social connections:     Talks on phone: None     Gets together: None     Attends Jewish service: None     Active member of club or organization: None     Attends meetings of clubs or organizations: None     Relationship status: None    Intimate partner violence:     Fear of current or ex partner: None     Emotionally abused: None     Physically abused: None     Forced sexual activity: None   Other Topics Concern    None   Social History Narrative    None       SCREENINGS             PHYSICAL EXAM  (up to 7 for level 4, 8 or more for level 5)     ED Triage Vitals   BP Temp Temp Source Pulse Resp SpO2 Height Weight   05/31/19 1459 05/31/19 1459 05/31/19 1459 05/31/19 1459 05/31/19 1459 05/31/19 1459 05/31/19 1459 05/31/19 1500   (!) 134/58 97.8 °F (36.6 °C) Oral 69 16 98 % 5' 5\" (1.651 m) 195 lb (88.5 kg)       Physical Exam   Constitutional: She is oriented to person, place, and time. She appears well-developed and well-nourished. No distress. HENT:   Head: Normocephalic and atraumatic. Eyes: Pupils are equal, round, and reactive to light. EOM are normal. Right eye exhibits no discharge. Left eye exhibits no discharge. No scleral icterus. Right eye exhibits normal extraocular motion and no nystagmus. Left eye exhibits normal extraocular motion and no nystagmus. Right pupil is round and reactive. Left pupil is round and reactive. Pupils are equal.   Neck: Trachea normal, normal range of motion and full passive range of motion without pain. Neck supple. Muscular tenderness present. No spinous process tenderness present. No neck rigidity.  No tracheal deviation, no 05/31/19 1459 05/31/19 1500   BP: (!) 134/58    Pulse: 69    Resp: 16    Temp: 97.8 °F (36.6 °C)    TempSrc: Oral    SpO2: 98%    Weight:  195 lb (88.5 kg)   Height: 5' 5\" (1.651 m)        Luis Estrella was given the following medications:  Medications   acetaminophen (TYLENOL) tablet 1,000 mg (has no administration in time range)   traMADol (ULTRAM) tablet 50 mg (has no administration in time range)       Luis Estrella was evaluated in the emergency department with concern for evaluation after a fall. Treated with Tylenol and Ultram in the ER for pain. CT negative head and neck are negative. Laboratory evaluation is unremarkable. My suspicion is low for serious injury including fracture, dislocation, compartment syndrome, or intracranial hemorrhage. The injury sounds consistent with mechanical fall. My suspicion is low for syncopy, arrhythmia, TIA, stroke, seizure, abuse, or PE. Luis Estrella is stable in the ER and safe to follow as an outpatient. The patient is discharged on the following medications. They were counseled on how to take the newly prescribed medications:  New Prescriptions    No medications on file    . The patient was additionally educated on: fall safety    Instructed to follow-up with:  Melyssa Blanchard MD  [address] [place] [address]  [phone]    Schedule an appointment as soon as possible for a visit in 3 days  For a recheck    Return to the ER for new or worsening symptoms. This plan was discussed with the patient and all family available in the room at discharge who are all in agreement with the plan. I evaluated the patient. The physician was available for consultation as needed. The patient and / or the family were informed of the results of any tests, a time was given to answer questions, a plan was proposed and they agreed with plan. FINAL IMPRESSION      1. Fall at nursing home, initial encounter    2.  Closed head injury, initial encounter 3. Strain of neck muscle, initial encounter    4.  Anticoagulated on Coumadin          DISPOSITION/PLAN   DISPOSITION Decision To Discharge 05/31/2019 05:20:47 PM        DISCONTINUED MEDICATIONS:  Discontinued Medications    No medications on file                (Please note that portions of this note were completed with a voice recognition program.  Efforts were made to edit the dictations but occasionally words are mis-transcribed.)    GONSALO Vogel CNP (electronically signed)        GONSALO Vogel CNP  05/31/19 1161

## 2019-05-31 NOTE — ED NOTES
Bed: 01  Expected date:   Expected time:   Means of arrival: [facility]  Comments:  [address] [facility], [address] [place] [address]  05/31/19 3791

## 2019-05-31 NOTE — ED NOTES
Sp[facility] to [facility]     [facility]  05/31/19 1913

## 2019-06-09 PROBLEM — E78.00 HIGH CHOLESTEROL: Status: ACTIVE | Noted: 2019-01-01

## 2019-06-09 PROBLEM — N17.9 AKI (ACUTE KIDNEY INJURY) (HCC): Status: ACTIVE | Noted: 2019-01-01

## 2019-06-09 PROBLEM — N17.9 ARF (ACUTE RENAL FAILURE) (HCC): Status: ACTIVE | Noted: 2019-01-01

## 2019-06-09 PROBLEM — E03.9 HYPOTHYROID: Status: ACTIVE | Noted: 2019-01-01

## 2019-06-09 PROBLEM — I10 HTN (HYPERTENSION): Status: ACTIVE | Noted: 2019-01-01

## 2019-06-09 PROBLEM — R55 SYNCOPE: Status: ACTIVE | Noted: 2019-01-01

## 2019-06-09 NOTE — ED NOTES
Pt. Simon Acosta in today by [facility] from [facility] after she had a witnessed fall by her daughter. Daughter reports that pt. Hewitt dizzy prior to falling and fell backwards hitting her head, but did not lose consciousness. Daughter concerned because pt. Is currently on coumadin and plavix. Pt. Had another similar episode approximately one week ago were she felt dizzy and fell forwards and hit her head. Pt. Denies any pain at time of RN triage although pt. Is grimacing her face. Pt. Occasionally states that her belly is bothering her. Daughter reports that pt. Has a history of aphasia. Pt. Is able to tell you her name, but unable to tell you her birthday. Pt. Is alert to self with no signs of distress noted, skin warm and dry, respirations easy and non-labored. Pt. Josefina Spearing on cardiac monitor, cycling BP cuff, and continuous pulse ox, daughter at pt. Bedside, denies any needs, will continue to monitor.      Adrien Duncan RN  06/09/19 5659

## 2019-06-09 NOTE — ED NOTES
Pt. Straight cathed for urine specimen and tolerated well. Urine specimen obtained, labeled at bedside, and sent down to the lab.        Kailash Lo RN  06/09/19 5176

## 2019-06-09 NOTE — ED PROVIDER NOTES
[address] [facility]        Pt Name: Tram Nichols  MRN: [id number]  [place] 1934  Date of evaluation: 6/9/2019  Provider: Bran Rowley PA-C  PCP: Isa Pickard MD    This patient was seen and evaluated by the attending physician Dr. Meggan Garcia. CHIEF COMPLAINT       Chief Complaint   Patient presents with    Fall     Pt. comes in by [facility] home after she fell. Daughter was with pt. when she fell. Pt. felt dizzy prior to falling, daughter states she went backwards and hit her head on her bed. Daughter also concerned becuase left hip is bothering her and had not had it looked at. HISTORY OF PRESENT ILLNESS   (Location/Symptom, Timing/Onset, Context/Setting, Quality, Duration, Modifying Factors, Severity)  Note limiting factors. Tram Ncihols is a 80 y.o. female presents for evaluation after a witnessed fall that occurred just prior to arrival. Patient reports that she felt dizzy which caused her to fall backwards. She didn't hit her head on the edge of her bed and has since been complaining of headache, continued dizziness, back and bilateral hip pain, left greater than right. Daughter reports the patient has been complaining of hip pain for the past couple of weeks and has had difficulty getting and out of cars and ambulating. During my evaluation, patient also started reporting difficulty or changes in her vision. She does have history of dementia and aphasia, per daughter. Daughter reports that squad had told her initially that one pupil was larger than the other. She is anticoagulated on Coumadin and Plavix. She denies any numbness, tingling or weakness of her extremities. No saddle anesthesia, bladder retention or bowel incontinence. No chest pain or shortness of breath. No abdominal pain, nausea or vomiting. She has no other complaints or concerns at this time.     Nursing Notes were all reviewed and agreed with or any disagreements were addressed  in the HPI. REVIEW OF SYSTEMS    (2-9 systems for level 4, 10 or more for level 5)     Review of Systems   Constitutional: Negative for appetite change, chills and fever. HENT: Negative for congestion and rhinorrhea. Eyes: Positive for visual disturbance. Respiratory: Negative for cough, shortness of breath and wheezing. Cardiovascular: Negative for chest pain. Gastrointestinal: Negative for abdominal pain, diarrhea, nausea and vomiting. Genitourinary: Negative for difficulty urinating, dysuria and hematuria. Musculoskeletal: Positive for arthralgias (B/L hip pain), back pain and neck pain. Negative for neck stiffness. Skin: Negative for rash. Neurological: Positive for dizziness and headaches. Negative for syncope, facial asymmetry, speech difficulty, weakness and light-headedness. Positives and Pertinent negatives as per HPI. Except as noted abovein the ROS, all other systems were reviewed and negative.        PAST MEDICAL HISTORY     Past Medical History:   Diagnosis Date    Anemia     Hyperlipidemia     Hypertension     Thyroid disease     Unspecified cerebral artery occlusion with cerebral infarction          SURGICAL HISTORY     Past Surgical History:   Procedure Laterality Date    CHOLECYSTECTOMY      HYSTERECTOMY           CURRENTMEDICATIONS       Previous Medications    ACETAMINOPHEN (TYLENOL) 325 MG TABLET    Take 650 mg by mouth every 6 hours as needed for Pain    ATORVASTATIN (LIPITOR) 80 MG TABLET    Take 80 mg by mouth daily    CLOPIDOGREL (PLAVIX) 75 MG TABLET    Take 75 mg by mouth daily    FAMOTIDINE (PEPCID) 20 MG TABLET    Take 20 mg by mouth daily    FERROUS GLUCONATE (FERGON) 324 (38 FE) MG TABLET    Take 324 mg by mouth daily (with breakfast)    GABAPENTIN (NEURONTIN) 300 MG CAPSULE    Take 300 mg by mouth 4 times daily    GLUCOSAMINE 500 MG CAPS    Take 500 mg by mouth daily    HYDROCORTISONE 1 % CREAM Apply topically 2 times daily as needed Apply topically 2 times daily. LACTULOSE (CHRONULAC) 10 GM/15ML SOLUTION    Take 30 mLs by mouth 2 times daily    LEVOTHYROXINE (SYNTHROID) 150 MCG TABLET    Take 125 mcg by mouth Daily     LOSARTAN-HYDROCHLOROTHIAZIDE (HYZAAR) 100-12.5 MG PER TABLET    Take 1 tablet by mouth daily    MECLIZINE (ANTIVERT) 12.5 MG TABLET    Take 12.5 mg by mouth 3 times daily as needed    MENTHOL-CETYLPYRIDINIUM (CEPACOL) 3 MG LOZENGE    Take 1 lozenge by mouth every 2 hours as needed for Sore Throat    NYSTATIN (MYCOSTATIN) POWD POWDER    Apply topically every 6 hours as needed For redness or rash    POTASSIUM CHLORIDE (KLOR-CON) 10 MEQ EXTENDED RELEASE TABLET    Take 10 mEq by mouth daily    RIFAXIMIN (XIFAXAN) 550 MG TABLET    Take 1 tablet by mouth 2 times daily    VITAMIN D 1000 UNITS CAPS    Take 1 capsule by mouth daily    WARFARIN (COUMADIN) 5 MG TABLET    Take 5 mg by mouth    WARFARIN (COUMADIN) 6 MG TABLET    Take 6 mg by mouth every other day    WARFARIN (COUMADIN) 6 MG TABLET    Take 6.5 mg by mouth every other day         ALLERGIES     Patient has no known allergies. FAMILYHISTORY     History reviewed. No pertinent family history.        SOCIAL HISTORY       Social History     Socioeconomic History    Marital status:      Spouse name: None    Number of children: None    Years of education: None    Highest education level: None   Occupational History    None   Social Needs    Financial resource strain: None    Food insecurity:     Worry: None     Inability: None    Transportation needs:     Medical: None     Non-medical: None   Tobacco Use    Smoking status: Never Smoker    Smokeless tobacco: Never Used   Substance and Sexual Activity    Alcohol use: No    Drug use: No    Sexual activity: None   Lifestyle    Physical activity:     Days per week: None     Minutes per session: None    Stress: None   Relationships    Social connections:     Talks on phone: None     Gets together: None     Attends Synagogue service: None     Active member of club or organization: None     Attends meetings of clubs or organizations: None     Relationship status: None    Intimate partner violence:     Fear of current or ex partner: None     Emotionally abused: None     Physically abused: None     Forced sexual activity: None   Other Topics Concern    None   Social History Narrative    None       SCREENINGS             PHYSICAL EXAM    (up to 7 for level 4, 8 or more for level 5)     ED Triage Vitals [06/09/19 1508]   BP Temp Temp Source Pulse Resp SpO2 Height Weight   134/64 97.7 °F (36.5 °C) Oral 73 18 96 % 5' 4\" (1.626 m) 205 lb (93 kg)       Physical Exam   Constitutional: She appears well-developed and well-nourished. HENT:   Head: Normocephalic and atraumatic. Head is without raccoon's eyes, without Brice's sign, without abrasion, without contusion and without laceration. Right Ear: External ear normal.   Left Ear: External ear normal.   Nose: Nose normal.   Eyes: Pupils are equal, round, and reactive to light. Conjunctivae and EOM are normal. Right eye exhibits no discharge. Left eye exhibits no discharge. Neck: Normal range of motion and full passive range of motion without pain. Neck supple. No spinous process tenderness and no muscular tenderness present. Cardiovascular: Normal rate, regular rhythm and normal heart sounds. No murmur heard. Pulmonary/Chest: Effort normal and breath sounds normal. No respiratory distress. She has no wheezes. She has no rales. She exhibits no tenderness. Abdominal: Soft. Bowel sounds are normal. She exhibits no distension and no mass. There is no tenderness. There is no guarding. Musculoskeletal: Normal range of motion. Right hip: She exhibits tenderness. Left hip: She exhibits no tenderness and no bony tenderness. Lumbar back: She exhibits pain. She exhibits no tenderness and no bony tenderness. Neurological: She is alert. She has normal strength. She is disoriented (h/o dementia). No cranial nerve deficit or sensory deficit. GCS eye subscore is 4. GCS verbal subscore is 5. GCS motor subscore is 6. Skin: Skin is warm and dry. She is not diaphoretic. Psychiatric: She has a normal mood and affect. Her behavior is normal.   Nursing note and vitals reviewed.       DIAGNOSTIC RESULTS   LABS:    Labs Reviewed   CBC WITH AUTO DIFFERENTIAL - Abnormal; Notable for the following components:       Result Value    RBC 3.89 (*)     .3 (*)     Platelets 746 (*)     Lymphocytes # 0.8 (*)     All other components within normal limits    Narrative:     Performed at:  [facility] [address], [facility], [address]   Phone [phone]   COMPREHENSIVE METABOLIC PANEL - Abnormal; Notable for the following components:    Glucose 104 (*)     CREATININE 1.7 (*)     GFR Non- 29 (*)     GFR  35 (*)     AST 39 (*)     All other components within normal limits    Narrative:     Performed at:  [facility] [address], [facility], [facility] [employer]   Phone [phone]   PROTIME-INR - Abnormal; Notable for the following components:    Protime 23.6 (*)     INR 2.07 (*)     All other components within normal limits    Narrative:     Performed at:  [facility] [address], [facility], [facility] [employer]   Phone [phone]   URINE RT REFLEX TO CULTURE    Narrative:     Performed at:  [facility] [address], [facility], [facility] [employer]   Phone [phone]   TROPONIN    Narrative:     Performed at:  [facility] [address], [facility], [facility] [employer]   Phone [phone]   APTT    Narrative:     Performed at:  [facility] [address], [facility], [facility] [employer]   Phone [phone] PEPTIDE    Narrative:     Performed at:  [facility]  [address], [address]   Phone [phone]       All other labs were within normal range or not returned as of this dictation. EKG: All EKG's are interpreted by the Emergency Department Physician who either signs orCo-signs this chart in the absence of a cardiologist.  Please see their note for interpretation of EKG. RADIOLOGY:   Non-plain film images such as CT, Ultrasound and MRI are read by the radiologist. Plain radiographic images are visualized andpreliminarily interpreted by the  ED Provider with the below findings:        Interpretation [facility] Radiologist below, if available at the time of this note:    XR HIP 3-4 VW W PELVIS BILATERAL   Final Result   No gross fracture. XR CHEST PORTABLE   Final Result   1. Mild pulmonary edema. CT LUMBAR SPINE WO CONTRAST   Final Result   1. No acute osseous abnormality of the lumbar spine. 2. Multilevel degenerative changes as described. CT Cervical Spine WO Contrast   Final Result   Multilevel degenerative disc disease, without spondylolisthesis or gross   fracture. CT HEAD WO CONTRAST   Final Result   No acute intracranial abnormality. No results found.        PROCEDURES   Unless otherwise noted below, none     Procedures    CRITICAL CARE TIME   N/A    CONSULTS:  IP CONSULT TO INTERNAL MEDICINE      EMERGENCY DEPARTMENT COURSE and DIFFERENTIALDIAGNOSIS/MDM:   Vitals:    Vitals:    06/09/19 1508 06/09/19 1630 06/09/19 1730 06/09/19 1900   BP: 134/64 (!) 153/71 (!) 146/64 (!) 152/58   Pulse: 73 77 79 79   Resp: 18 11 12 17   Temp: 97.7 °F (36.5 °C)      TempSrc: Oral      SpO2: 96% 97% 97% 96%   Weight: 205 lb (93 kg)      Height: 5' 4\" (1.626 m)          Patient was given thefollowing medications:  Medications   0.9 % sodium chloride bolus (1,000 mLs Intravenous New Bag 6/9/19 1918)       Patient presents for evaluation of head injury status post fall. On exam, she is resting comfortable in bed in no acute distress and nontoxic. Vitals are stable and she is afebrile. She is disoriented and acting appropriate and at her baseline, with history of dementia. Per daughter. GCS 15. Cranial nerves II through XII are intact. Scalp is atraumatic and neck is nontender, full range of motion intact. She is moving all extremities without difficulty or focal neurologic deficit appreciated. Lungs are clear to auscultation bilaterally, chest is nontender and abdomen is benign. She does have mild diffuse tenderness to right lateral hip with no bony step-offs or crepitus. No other reproducible tenderness appreciated along the spine. No saddle anesthesia, bladder retention or bowel incontinence. She is still comport reporting headache, eye pain and dizziness. She'll be reevaluated. Please see attending note for EKG interpretation. CBC and CMP are unremarkable for an acute kidney injury with a creatinine of 1.7. Troponin is negative. Coags are therapeutic with an INR 2.07. . Urinalysis is negative. X-ray of the bilateral hips showed no gross fracture. Portal chest x-ray shows mild pulmonary edema. CT of the head Shows no acute intracranial abnormality. CT of cervical spine shows multilevel degenerative disc disease without spondylolisthesis. He says sort gross fracture. X-ray lumbar spine shows no acute osseous abnormalities. I do the patient warrants admission for further evaluation and management of increased confusion, dizziness, headache secondary to fall as well as acute kidney injury, likely secondary to dehydration. I spoke with the admitting physician, Dr. Belia Morris, who is agreeable to this plan and will resume care of the patient at this time. Patient and family were also informed and are agreeable. She is stable for admission. FINAL IMPRESSION      1. Fall, initial encounter    2. Injury of head, initial encounter    3. Dizziness    4. Anticoagulated on Coumadin    5. RADHA (acute kidney injury) [facility])          DISPOSITION/PLAN   DISPOSITION Decision To Admit 06/09/2019 07:01:26 PM      PATIENT REFERREDTO:  No follow-up provider specified.     DISCHARGE MEDICATIONS:  New Prescriptions    No medications on file       DISCONTINUED MEDICATIONS:  Discontinued Medications    No medications on file              (Please note that portions ofthis note were completed with a voice recognition program.  Efforts were made to edit the dictations but occasionally words are mis-transcribed.)    Sumit Weaver PA-C (electronically signed)            Carol Gonzalez, [place]  06/09/19 2234

## 2019-06-10 NOTE — PROGRESS NOTES
Occupational Therapy  Facility/Department: [address] ONCOLOGY  Daily Treatment Note  NAME: Ada Arevalo  : 1934  MRN: [id number]    Date of Service: 6/10/2019    Discharge Recommendations:       Ada Arevalo scored a 15/24 on the AM-PAC ADL Inpatient form. Current research shows that an AM-PAC score of 17 or less is typically not associated with a discharge to the patient's home setting. Based on the patients AM-PAC score and their current ADL deficits, it is recommended that the patient have 3-5 sessions per week of Occupational Therapy at d/c to increase the patients independence. 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, hx of falls, minere's disease and left hip and knee pain  Prognosis: Good  Decision Making: Medium Complexity  Exam: rom, mobility, visual/ vestibular dysfunction, pain,   Assistance / Modification: physical assist due to pain  Patient Education: OT evaluation, plan of care  Barriers to Learning: pain, aphasia  REQUIRES OT FOLLOW UP: Yes  Activity Tolerance  Activity Tolerance: Patient limited by pain  Activity Tolerance: recommend MD consider x ray to distal femur/ knee  Safety Devices  Safety Devices in place: Yes  Type of devices: All fall risk precautions in place; Patient at risk for falls; Left in bed;Bed alarm in place;Call light within reach;Nurse notified         Patient Diagnosis(es): The primary encounter diagnosis was Fall, initial encounter. Diagnoses of Injury of head, initial encounter, Dizziness, Anticoagulated on Coumadin, and RADHA (acute kidney injury) ([facility] [address] were also pertinent to this visit.       has a past medical history of Anemia, Hyperlipidemia, Hypertension, Thyroid disease, and Unspecified cerebral artery occlusion with cerebral infarction. has a past surgical history that includes Cholecystectomy and Hysterectomy. Restrictions  Restrictions/Precautions  Restrictions/Precautions: Fall Risk(high fall risk )  Required Braces or Orthoses?: No  Subjective   General  Chart Reviewed: Yes  Patient assessed for rehabilitation services?: Yes  Diagnosis: head and hip pain following a fall   Subjective  Subjective: pt supine in bed upon arrival, daughter present and agreeable to treatment. Daughter aided in answering questions regarding independence status. Sat  General Comment  Comments:   Pt. comes in by [facility] after she fell. Daughter was with pt. when she fell. Pt. felt dizzy prior to falling, daughter states she went backwards and hit her head on her bed. Daughter also concerned becuase left hip is bothering her and had not had it looked at. Pain Assessment  Pain Assessment: 0-10  Pain Level: 0  Patient's Stated Pain Goal: No pain  Vital Signs  BP: (!) 151/76  BP Location: Right upper arm  BP Upper/Lower: Upper  MAP (mmHg): 101   Orientation  Orientation  Overall Orientation Status: Within Functional Limits(expressive aphasia )  Objective    ADL  Additional Comments: none completed on this date        Balance  Sitting Balance: Supervision  Bed mobility  Supine to Sit: Minimal assistance  Sit to Supine: 2 Person assistance(Diana of two d/t leg pain )  Scootin Person assistance  Comment: dizzy when first sitting up at edge of bed  Transfers  Transfer Comments: deferred due to pain in knee and request for x rays                       Cognition  Overall Cognitive Status: Exceptions  Arousal/Alertness: Appropriate responses to stimuli  Following Commands:  Follows one step commands with increased time  Attention Span: Difficulty attending to directions  Memory: Appears intact  Safety Judgement: Good awareness of safety precautions  Problem Solving: Assistance required to generate solutions  Insights: Fully aware

## 2019-06-10 NOTE — PROGRESS NOTES
Progress Note - Dr. Belia Morris - Internal Medicine  PCP: Glenn Kwan MD Karli Harmon / [address] [place] [address] [phone]    Hospital Day: 1  Code Status: Full Code  Current Diet: DIET RENAL;        CC: follow up on medical issues    Subjective:   Leanne Wan is a 80 y.o. female. She denies new problems    Doing ok  Creat a little higher  Pt still c/o hip pain    She denies chest pain, denies shortness of breath, denies nausea,  denies emesis. 10 system Review of Systems is reviewed with patient, and pertinent positives are listed here: None . Otherwise, Review of systems is negative. I have reviewed the patient's medical and social history in detail and updated the computerized patient record. To recap: She  has a past medical history of Anemia, Hyperlipidemia, Hypertension, Thyroid disease, and Unspecified cerebral artery occlusion with cerebral infarction. . She  has a past surgical history that includes Cholecystectomy and Hysterectomy. . She  reports that she has never smoked. She has never used smokeless tobacco. She reports that she does not drink alcohol or use drugs. .        Active Hospital Problems    Diagnosis Date Noted    [facility] ([facility] [N17.9] 06/09/2019    High cholesterol [E78.00] 06/09/2019    HTN (hypertension) [I10] 06/09/2019    Hypothyroid [E03.9] 06/09/2019    Syncope [R55] 06/09/2019    RADHA (acute kidney injury) ([facility] [N17.9] 06/09/2019       Current Facility-Administered Medications: 0.9 % sodium chloride infusion, , Intravenous, Continuous  sodium chloride flush 0.9 % injection 10 mL, 10 mL, Intravenous, 2 times per day  sodium chloride flush 0.9 % injection 10 mL, 10 mL, Intravenous, PRN  docusate sodium (COLACE) capsule 100 mg, 100 mg, Oral, BID  ondansetron (ZOFRAN) injection 4 mg, 4 mg, Intravenous, Q6H PRN  heparin (porcine) injection 5,000 Units, 5,000 Units, Subcutaneous, 3 times per day  clopidogrel (PLAVIX) tablet 75 mg, 75 mg, Oral, Daily  levothyroxine (SYNTHROID) tablet 125 mcg, 125 mcg, Oral, Daily  gabapentin (NEURONTIN) capsule 300 mg, 300 mg, Oral, 4x Daily  ferrous gluconate 324 (37.5 Fe) MG tablet 324 mg, 324 mg, Oral, Daily with breakfast  rifaximin (XIFAXAN) tablet 550 mg, 550 mg, Oral, BID  lactulose (CHRONULAC) 10 GM/15ML solution 20 g, 20 g, Oral, BID  famotidine (PEPCID) tablet 20 mg, 20 mg, Oral, Daily  potassium chloride (KLOR-CON) extended release tablet 10 mEq, 10 mEq, Oral, Daily  acetaminophen (TYLENOL) tablet 650 mg, 650 mg, Oral, Q6H PRN  vitamin D (CHOLECALCIFEROL) tablet 1,000 Units, 1,000 Units, Oral, Daily  losartan (COZAAR) tablet 100 mg, 100 mg, Oral, Daily  potassium chloride (KLOR-CON M) extended release tablet 40 mEq, 40 mEq, Oral, PRN **OR** potassium bicarb-citric acid (EFFER-K) effervescent tablet 40 mEq, 40 mEq, Oral, PRN **OR** potassium chloride 10 mEq/100 mL IVPB (Peripheral Line), 10 mEq, Intravenous, PRN  hydrALAZINE (APRESOLINE) injection 10 mg, 10 mg, Intravenous, Q6H PRN  0.9 % sodium chloride bolus, 500 mL, Intravenous, PRN  warfarin (COUMADIN) daily dosing (placeholder), , Other, RX Placeholder         Objective:  BP (!) 151/76   Pulse 67   Temp 97.7 °F (36.5 °C) (Oral)   Resp 16   Ht 5' 4\" (1.626 m)   Wt 200 lb 14.4 oz (91.1 kg)   SpO2 96%   BMI 34.48 kg/m²      Patient Vitals for the past 24 hrs:   BP Temp Temp src Pulse Resp SpO2 Height Weight   06/10/19 0817 (!) 151/76 -- -- -- -- -- -- --   06/10/19 0427 (!) 174/72 97.7 °F (36.5 °C) Oral 67 16 96 % -- --   06/10/19 0032 111/68 97.4 °F (36.3 °C) Oral 75 16 96 % -- --   06/09/19 2157 126/65 98.8 °F (37.1 °C) Oral 78 16 98 % 5' 4\" (1.626 m) 200 lb 14.4 oz (91.1 kg)   06/09/19 2115 (!) 163/59 -- -- -- -- -- -- --   06/09/19 1930 (!) 142/95 -- -- 78 14 98 % -- --   06/09/19 1900 (!) 152/58 -- -- 79 17 96 % -- --   06/09/19 1730 (!) 146/64 -- -- 79 12 97 % -- --   06/09/19 1630 (!) 153/71 -- -- 77 11 97 % -- --   06/09/19 1508 134/64 97.7 °F FINDINGS: BRAIN/VENTRICLES: There is no acute intracranial hemorrhage, mass effect or midline shift. No abnormal extra-axial fluid collection. The gray-white differentiation is maintained without evidence of an acute infarct. There is no evidence of hydrocephalus. Encephalomalacia related to remote infarct of the left frontal lobe again noted. ORBITS: The visualized portion of the orbits demonstrate no acute abnormality. SINUSES: The visualized paranasal sinuses and mastoid air cells demonstrate no acute abnormality. SOFT TISSUES/SKULL:  No acute abnormality of the visualized skull or soft tissues. No acute intracranial abnormality. Ct Cervical Spine Wo Contrast    Result Date: 6/9/2019  EXAMINATION: CT OF THE CERVICAL SPINE WITHOUT CONTRAST 6/9/2019 3:31 pm TECHNIQUE: CT of the cervical spine was performed without the administration of intravenous contrast. Multiplanar reformatted images are provided for review. Dose modulation, iterative reconstruction, and/or weight based adjustment of the mA/kV was utilized to reduce the radiation dose to as low as reasonably achievable. COMPARISON: 05/31/2019 HISTORY: ORDERING SYSTEM PROVIDED HISTORY: fall, head injury TECHNOLOGIST PROVIDED HISTORY: If patient is on cardiac monitor and/or pulse ox, they may be taken off cardiac monitor and pulse ox, left on O2 if currently on. All monitors reattached when patient returns to room. Ordering Physician Provided Reason for Exam: Fall (Pt. comes in by [facility] after she fell. Daughter was with pt. when she fell. Pt. felt dizzy prior to falling, daughter states she went backwards and hit her head on her bed. Daughter also concerned becuase left hip is bothering her and had not had it looked at.) Acuity: Acute Type of Exam: Initial Mechanism of Injury: fall FINDINGS: BONES/ALIGNMENT: Multiple contiguous axial images were obtained of the cervical spine. Sagittal and coronal reformats were obtained. Multilevel disc space narrowing is seen with endplate osteophytosis, compatible with degenerative disc disease. Vacuum phenomenon at C4-C5 and C5-C6. No spondylolisthesis. Moderate spurring is seen at C1-C2, similar to prior. No marked vertebral body height loss. No gross fracture is seen. DEGENERATIVE CHANGES: As above SOFT TISSUES: Bilateral carotid artery calcification. Mucosal thickening of sphenoid sinus, incompletely imaged. Multilevel degenerative disc disease, without spondylolisthesis or gross fracture. Ct Cervical Spine Wo Contrast    Result Date: 5/31/2019  EXAMINATION: CT OF THE CERVICAL SPINE WITHOUT CONTRAST 5/31/2019 3:27 pm TECHNIQUE: CT of the cervical spine was performed without the administration of intravenous contrast. Multiplanar reformatted images are provided for review. Dose modulation, iterative reconstruction, and/or weight based adjustment of the mA/kV was utilized to reduce the radiation dose to as low as reasonably achievable. COMPARISON: None. HISTORY: ORDERING SYSTEM PROVIDED HISTORY: fall, injury TECHNOLOGIST PROVIDED HISTORY: Ordering Physician Provided Reason for Exam: Fall (pt brought in by [facility] from [facility]. Pt c/o headache after a witness mechanical fall this am at appox 0830. Did hit forehead. Denies LOC. Pt was monitored at facility and given tyelonel for pain. Pt is on blood thinners . At appox 1400 [facility] called 911 due to pt reporting a headache. Pt hx of Dementia. Squad and family reports pt at baseline. A&O to self.) Acuity: Acute Type of Exam: Initial Mechanism of Injury: fall FINDINGS: BONES/ALIGNMENT: No evidence of fracture or subluxation DEGENERATIVE CHANGES: Degenerative disc disease C4-C5 through C6-C7. Facet osteoarthritis C2-C3 on the right, and C3-C4 and C7-T1 on the left. SOFT TISSUES: There is no prevertebral soft tissue swelling. No acute abnormality of the cervical spine.      Ct Lumbar Spine Wo Contrast    Result Date: 6/9/2019  EXAMINATION: CT OF THE LUMBAR SPINE WITHOUT CONTRAST,  6/9/2019 TECHNIQUE: CT of the lumbar spine was performed without the administration of intravenous contrast. Multiplanar reformatted images are provided for review. Dose modulation, iterative reconstruction, and/or weight based adjustment of the mA/kV was utilized to reduce the radiation dose to as low as reasonably achievable. COMPARISON: None HISTORY: ORDERING SYSTEM PROVIDED HISTORY: Fall, pain TECHNOLOGIST PROVIDED HISTORY: Reason for exam:->Fall, pain Ordering Physician Provided Reason for Exam: Fall ([facility] Daughter was with pt. when she fell. Pt. felt dizzy prior to falling, daughter states she went backwards and hit her head on her bed. Daughter also concerned because left hip is bothering her and had not had it looked at.) Acuity: Acute Type of Exam: Initial Mechanism of Injury: Fall FINDINGS: BONES/ALIGNMENT: No acute fracture of the lumbar spine. Anterolisthesis of L4 on L5 measures 6 mm. DEGENERATIVE CHANGES: Severe degenerative disc disease is identified at L2-3. Moderate degenerative disc disease is identified at L3-4 and L5-S1. Findings are associated with endplate sclerosis and spurring. Severe facet arthropathy is seen at L3-4, L4-5, and L5-S1. SOFT TISSUES/RETROPERITONEUM: No paraspinal mass is seen. Other: Severe atherosclerosis. 1. No acute osseous abnormality of the lumbar spine. 2. Multilevel degenerative changes as described. Xr Chest Portable    Result Date: 6/9/2019  EXAMINATION: ONE XRAY VIEW OF THE CHEST 6/9/2019 3:24 pm COMPARISON: None HISTORY: ORDERING SYSTEM PROVIDED HISTORY: Chest Discomfort TECHNOLOGIST PROVIDED HISTORY: Reason for exam:->Chest Discomfort Ordering Physician Provided Reason for Exam: recent fall Acuity: Acute Relevant Medical/Surgical History: dementia FINDINGS: Frontal view of the chest demonstrates no lines or tubes.   Cardiomediastinal

## 2019-06-10 NOTE — PLAN OF CARE
Free of falls. Physical signs of pain but states no when asked if having pain. Pt moans with facial grimance.

## 2019-06-10 NOTE — CONSULTS
Gastroenterology Consult Note      Patient: Carmelina Barroso  : 1934  Acct#:      Date:  6/10/2019    Subjective:       History of Present Illness  Patient is a 80 y.o.   female admitted with RADHA (acute kidney injury) ([facility] [N17.9]  RADHA (acute kidney injury) ([facility] [N17.9] who is seen in consult for cirrhosis. H/o CVA, expressive aphasia. H/o cirrhosis felt to be from KRAUS and is followed by Dr Brigette Tracy. H/o small ascites in 2016 but not enough for paracentesis. Not on diuretics. No prior screening EGD d/t age. H/o HE on xifaxan BID and on lactulose. Reports having 1-2 stools on lactulose. She had 2 falls within the past week. After the second fall, she was admitted with RADHA. She is sleeping but awakens. Daughter thinks pt is a little confused. Pt denies abdominal pain, N/V. She had a large nonbloody BM once today with lactulose. Past Medical History:   Diagnosis Date    Anemia     Hyperlipidemia     Hypertension     Thyroid disease     Unspecified cerebral artery occlusion with cerebral infarction       Past Surgical History:   Procedure Laterality Date    CHOLECYSTECTOMY      HYSTERECTOMY        Past Endoscopic History    Admission Meds  No current facility-administered medications on file prior to encounter. Current Outpatient Medications on File Prior to Encounter   Medication Sig Dispense Refill    menthol-cetylpyridinium (CEPACOL) 3 MG lozenge Take 1 lozenge by mouth every 2 hours as needed for Sore Throat      warfarin (COUMADIN) 6 MG tablet Take 6 mg by mouth every other day      warfarin (COUMADIN) 6 MG tablet Take 6.5 mg by mouth every other day      Glucosamine 500 MG CAPS Take 500 mg by mouth daily      hydrocortisone 1 % cream Apply topically 2 times daily as needed Apply topically 2 times daily.        nystatin (MYCOSTATIN) POWD powder Apply topically every 6 hours as needed For redness or rash      famotidine (PEPCID) 20 MG tablet Take 20 mg by mouth daily  potassium chloride (KLOR-CON) 10 MEQ extended release tablet Take 10 mEq by mouth daily      acetaminophen (TYLENOL) 325 MG tablet Take 650 mg by mouth every 6 hours as needed for Pain      vitamin D 1000 units CAPS Take 1 capsule by mouth daily      rifaximin (XIFAXAN) 550 MG tablet Take 1 tablet by mouth 2 times daily 60 tablet 2    lactulose (CHRONULAC) 10 GM/15ML solution Take 30 mLs by mouth 2 times daily 1 Bottle 1    losartan-hydrochlorothiazide (HYZAAR) 100-12.5 MG per tablet Take 1 tablet by mouth daily      clopidogrel (PLAVIX) 75 MG tablet Take 75 mg by mouth daily      levothyroxine (SYNTHROID) 150 MCG tablet Take 125 mcg by mouth Daily       gabapentin (NEURONTIN) 300 MG capsule Take 300 mg by mouth 4 times daily      ferrous gluconate (FERGON) 324 (38 FE) MG tablet Take 324 mg by mouth daily (with breakfast)      atorvastatin (LIPITOR) 80 MG tablet Take 80 mg by mouth daily      meclizine (ANTIVERT) 12.5 MG tablet Take 12.5 mg by mouth 3 times daily as needed      warfarin (COUMADIN) 5 MG tablet Take 5 mg by mouth           Allergies  No Known Allergies   Social   Social History     Tobacco Use    Smoking status: Never Smoker    Smokeless tobacco: Never Used   Substance Use Topics    Alcohol use: No        History reviewed. No pertinent family history. Review of Systems  Review of systems not obtained due to patient factors. Physical Exam  Blood pressure 137/82, pulse 65, temperature 97.9 °F (36.6 °C), temperature source Oral, resp. rate 16, height 5' 4\" (1.626 m), weight 200 lb 14.4 oz (91.1 kg), SpO2 97 %. General appearance: sleeping but awakens, cooperative, no distress, appears stated age  Eyes: Anicteric  Head: Normocephalic, without obvious abnormality  Lungs: clear to auscultation bilaterally, Normal Effort  Heart: regular rate and rhythm, normal S1 and S2, no murmurs or rubs  Abdomen: soft, non-tender. Bowel sounds normal. No masses,  no organomegaly. Extremities: atraumatic, no cyanosis or edema  Skin: warm and dry, no jaundice  Neuro: awakens, verbal, not oriented to time. No asterixis. Musculoskeletal: 5/5  strength BUE      Data Review:    Recent Labs     06/09/19  1616 06/10/19  0540   WBC 5.5 3.3*   HGB 13.2 12.5   HCT 40.2 37.0   .3* 101.4*   * 77*     Recent Labs     06/09/19  1616 06/10/19  0540    145   K 4.1 4.5    114*   CO2 23 19*   BUN 20 24*   CREATININE 1.7* 2.0*     Recent Labs     06/09/19 1616   AST 39*   ALT 25   BILITOT 0.9   ALKPHOS 112     No results for input(s): LIPASE, AMYLASE in the last 72 hours. Recent Labs     06/09/19  1616 06/10/19  0540   PROTIME 23.6* 24.5*   INR 2.07* 2.15*     No results for input(s): PTT in the last 72 hours. No results for input(s): OCCULTBLD in the last 72 hours. Assessment:     Principal Problem:    ARF (acute renal failure) ([facility])  Active Problems:    High cholesterol    HTN (hypertension)    Hypothyroid    Syncope    RADHA (acute kidney injury) ([facility]  Resolved Problems:    * No resolved hospital problems. *    Cirrhosis - d/t KRAUS. Followed by Dr Brigette Tracy. No prior ascites requiring paracentesis. No GI bleeding. H/o hepatic encephalopathy. f-actin was weakly positive in the past.   Elevated ammonia - NH3 elevated at 83. She is confused but has dementia and aphasia. reports compliance with xifaxan. Does not seem to be compliant with lactulose as she is only having 1-2 BMs daily rather than 3 as previously recommended. Noncompliance or RADHA could have precipitated this. No UTI or pneumonia. RADHA - nephrology following. H/o falls - to have repeat CT head. Recommendations:   - lactulose TID and titrate for goal of 3 BMs daily  - xifaxan BID  - check US to eval for ascites  - repeat f-actin    Discussed with Dr. Winnie Whitaker, [address]    I have personally performed a face to face diagnostic evaluation on this patient.   I have interviewed and examined the patient and I agree with the findings and recommended plan of care. In summary, my findings and plan are the following: Pt alert but aphasic and seems a little confused. US wo ascites.  Continue meds for elevated ammonia but difficult to evaluate MS given dementia and aphasia       Nova Botello MD  [address]

## 2019-06-10 NOTE — FLOWSHEET NOTE
VSS. Patient resting comfortably. Daughter Dominik Wilson at bedside. 06/09/19 2157   Vital Signs   Temp 98.8 °F (37.1 °C)   Temp Source Oral   Pulse 78   Heart Rate Source Monitor   Resp 16   /65   BP Location Left upper arm   BP Upper/Lower Upper   Patient Position Semi fowlers   Level of Consciousness 0   MEWS Score 1   Patient Currently in Pain Yes   Height and Weight   Height 5' 4\" (1.626 m)   Weight 200 lb 14.4 oz (91.1 kg)   Weight Method Bed scale   BSA (Calculated - sq m) 2.03 sq meters   BMI (Calculated) 34.6   Pain Assessment   Pain Assessment 0-10   RASS Score (Ventilated) 0   Pain Level 8   Pain Descriptors Aching   Patient's Stated Pain Goal No pain   Pain Type Acute pain   Pain Location Head   Pain Orientation Left; Anterior   Pain Frequency Intermittent   Pain Onset Gradual   Multiple Pain Sites No   Oxygen Therapy   SpO2 98 %   Pulse Oximeter Device Mode Intermittent   Pulse Oximeter Device Location Finger   O2 Device None (Room air)

## 2019-06-10 NOTE — H&P
History and Physical  Dr. Maricel Mcintosh  6/9/2019    PCP: Angelita Halsted, MD    Cc:   Chief Complaint   Patient presents with    Fall     Pt. comes in by [facility] home after she fell. Daughter was with pt. when she fell. Pt. felt dizzy prior to falling, daughter states she went backwards and hit her head on her bed. Daughter also concerned becuase left hip is bothering her and had not had it looked at. HPI:  Jackie Manzano is a 80 y.o. female who has a past medical history of Anemia, Hyperlipidemia, Hypertension, Thyroid disease, and Unspecified cerebral artery occlusion with cerebral infarction. Patient presents with ARF (acute renal failure) ([facility] [address] HPI     80 y.o. female presents for evaluation after a witnessed fall that occurred just prior to arrival. Patient reports that she felt dizzy which caused her to fall backwards. She didn't hit her head on the edge of her bed and has since been complaining of headache, continued dizziness, back and bilateral hip pain, left greater than right. Hip pain rated 6/10. Throbbing in nature. Currently constant in timing. Daughter reports the patient has been complaining of hip pain for the past couple of weeks and has had difficulty getting and out of cars and ambulating. During my evaluation, patient also started reporting difficulty or changes in her vision. She does have history of dementia and aphasia, per daughter. Daughter reports that squad had told her initially that one pupil was larger than the other. She is anticoagulated on Coumadin and Plavix. She denies any numbness, tingling or weakness of her extremities. No saddle anesthesia, bladder retention or bowel incontinence. No chest pain or shortness of breath. No abdominal pain, nausea or vomiting. She has no other complaints or concerns at this time. CT Head from ER reviewed, no ICH seen. CT Hip reviewed by self, no fracture        Problem list of hospitalization thus far:   Active Hospital Problems Diagnosis    ARF (acute renal failure) (HCC) [N17.9]    High cholesterol [E78.00]    HTN (hypertension) [I10]    Hypothyroid [E03.9]    Syncope [R55]         Review of Systems: (1 system for EPF, 2-9 for detailed, 10+ for comprehensive)  Review of Systems   Constitutional: Negative for chills and fever. HENT: Negative for ear discharge and ear pain. Eyes: Positive for visual disturbance. Negative for itching. Respiratory: Negative for cough and shortness of breath. Cardiovascular: Negative for chest pain and leg swelling. Gastrointestinal: Negative for nausea and vomiting. Endocrine: Negative for cold intolerance and heat intolerance. Genitourinary: Negative for flank pain and hematuria. Musculoskeletal: Positive for back pain and gait problem. Skin: Negative for rash. Allergic/Immunologic: Negative for food allergies. Neurological: Positive for dizziness and headaches. Hematological: Negative for adenopathy. Psychiatric/Behavioral: Negative for dysphoric mood. The patient is not nervous/anxious. Past Medical History:   Past Medical History:   Diagnosis Date    Anemia     Hyperlipidemia     Hypertension     Thyroid disease     Unspecified cerebral artery occlusion with cerebral infarction        Past Surgical History:   Past Surgical History:   Procedure Laterality Date    CHOLECYSTECTOMY      HYSTERECTOMY         Social History:   Social History     Tobacco History     Smoking Status  Never Smoker    Smokeless Tobacco Use  Never Used          Alcohol History     Alcohol Use Status  No          Drug Use     Drug Use Status  No          Sexual Activity     Sexually Active  Not Asked                Fam History: History reviewed. No pertinent family history. PFSH: The above PMHx, PSHx, SocHx, FamHx has been reviewed by myself.  (1 area for detailed, 2-3 for comprehensive)      Code Status: Prior    Meds - following list of home medications fromelectronic chart has been reviewed by myself  Prior to Admission medications    Medication Sig Start Date End Date Taking? Authorizing Provider   menthol-cetylpyridinium (CEPACOL) 3 MG lozenge Take 1 lozenge by mouth every 2 hours as needed for Sore Throat   Yes Historical Provider, MD   warfarin (COUMADIN) 6 MG tablet Take 6 mg by mouth every other day   Yes Historical Provider, MD   warfarin (COUMADIN) 6 MG tablet Take 6.5 mg by mouth every other day   Yes Historical Provider, MD   Glucosamine 500 MG CAPS Take 500 mg by mouth daily   Yes Historical Provider, MD   hydrocortisone 1 % cream Apply topically 2 times daily as needed Apply topically 2 times daily.     Yes Historical Provider, MD   nystatin (MYCOSTATIN) POWD powder Apply topically every 6 hours as needed For redness or rash   Yes Historical Provider, MD   famotidine (PEPCID) 20 MG tablet Take 20 mg by mouth daily   Yes Historical Provider, MD   potassium chloride (KLOR-CON) 10 MEQ extended release tablet Take 10 mEq by mouth daily   Yes Historical Provider, MD   acetaminophen (TYLENOL) 325 MG tablet Take 650 mg by mouth every 6 hours as needed for Pain   Yes Historical Provider, MD   vitamin D 1000 units CAPS Take 1 capsule by mouth daily   Yes Historical Provider, MD   rifaximin (XIFAXAN) 550 MG tablet Take 1 tablet by mouth 2 times daily 8/16/16  Yes Familia Campos MD   lactulose (CHRONULAC) 10 GM/15ML solution Take 30 mLs by mouth 2 times daily 8/16/16  Yes Familia Campos MD   losartan-hydrochlorothiazide (HYZAAR) 100-12.5 MG per tablet Take 1 tablet by mouth daily   Yes Historical Provider, MD   clopidogrel (PLAVIX) 75 MG tablet Take 75 mg by mouth daily   Yes Historical Provider, MD   levothyroxine (SYNTHROID) 150 MCG tablet Take 125 mcg by mouth Daily    Yes Historical Provider, MD   gabapentin (NEURONTIN) 300 MG capsule Take 300 mg by mouth 4 times daily   Yes Historical Provider, MD   ferrous gluconate (FERGON) 324 (38 FE) MG tablet Take 324 mg by mouth daily (with breakfast)   Yes Historical Provider, MD   atorvastatin (LIPITOR) 80 MG tablet Take 80 mg by mouth daily    Historical Provider, MD   meclizine (ANTIVERT) 12.5 MG tablet Take 12.5 mg by mouth 3 times daily as needed    Historical Provider, MD   warfarin (COUMADIN) 5 MG tablet Take 5 mg by mouth    Historical Provider, MD         No Known Allergies          EXAM: (2-7 system for EPF/Detailed, ?8 for Comprehensive)  BP (!) 142/95   Pulse 78   Temp 97.7 °F (36.5 °C) (Oral)   Resp 14   Ht 5' 4\" (1.626 m)   Wt 205 lb (93 kg)   SpO2 98%   BMI 35.19 kg/m²   Constitutional: vitals as above: alert, appears stated age and cooperative  Psychiatric: normal insight and judgment, oriented to person, place, time, and general circumstances  Head: Normocephalic, without obvious abnormality, atraumatic  Eyes:lids and lashes normal, conjunctivae and sclerae normal and pupils equal, round, reactive to light and accomodation  EMNT: exeternal ears normal  Neck: no adenopathy, no JVD, supple, symmetrical, trachea midline and thyroid not enlarged, symmetric, no tenderness/mass/nodules   Respiratory: clear to auscultation without wheezes or rales  Cardiovascular: normal rate, regular rhythm, normal S1 and S2 and no gallops  Gastrointestinal: soft, non-tender, non-distended, normal bowel sounds, no masses or organomegaly  Extremities: no edema  Skin:No rashes or nodules noted.   Neurologic:negative    LABS:  Labs Reviewed   CBC WITH AUTO DIFFERENTIAL - Abnormal; Notable for the following components:       Result Value    RBC 3.89 (*)     .3 (*)     Platelets 989 (*)     Lymphocytes # 0.8 (*)     All other components within normal limits    Narrative:     Performed at:  [facility]  [address],  [place], [address]   Phone [phone]   COMPREHENSIVE METABOLIC PANEL - Abnormal; Notable for the following components:    Glucose 104 (*)     CREATININE 1.7 (*)     GFR Non- 29 (*)     GFR  35 (*)     AST 39 (*)     All other components within normal limits    Narrative:     Performed at:  [facility]  [address], [place]   Phone [phone]   PROTIME-INR - Abnormal; Notable for the following components:    Protime 23.6 (*)     INR 2.07 (*)     All other components within normal limits    Narrative:     Performed at:  [facility]  [address], [place]   Phone [phone]   URINE RT REFLEX TO CULTURE    Narrative:     Performed at:  [facility] [address], [place]   Phone [phone]   TROPONIN    Narrative:     Performed at:  [facility]  [address], [place]   Phone [phone]   APTT    Narrative:     Performed at:  [facility] [address]   Phone [phone] PEPTIDE    Narrative:     Performed at:  [facility] [address], [place] [place]   Phone [phone]         IMAGING:  Imaging results from the ER have been reviewed in the computerized chart. Ct Head Wo Contrast    Result Date: 6/9/2019  EXAMINATION: CT OF THE HEAD WITHOUT CONTRAST  6/9/2019 3:31 pm TECHNIQUE: CT of the head was performed without the administration of intravenous contrast. Dose modulation, iterative reconstruction, and/or weight based adjustment of the mA/kV was utilized to reduce the radiation dose to as low as reasonably achievable. COMPARISON: CT 05/31/2019 HISTORY: ORDERING SYSTEM PROVIDED HISTORY: fall, head injury TECHNOLOGIST PROVIDED HISTORY: Has a \"code stroke\" or \"stroke alert\" been called? ->No Ordering Physician Provided Reason for Exam: Fall (Pt. comes in by [facility] home after she fell. Daughter was with pt. when she fell.  Pt. felt dizzy prior to falling, daughter states she went backwards and hit her head on her bed. Daughter also concerned becuase left hip is bothering her and had not had it looked at.) Acuity: Acute Type of Exam: Initial Mechanism of Injury: fall FINDINGS: BRAIN/VENTRICLES: There is no acute intracranial hemorrhage, mass effect or midline shift. No abnormal extra-axial fluid collection. The gray-white differentiation is maintained without evidence of an acute infarct. There is no evidence of hydrocephalus. Encephalomalacia in the left frontal lobe is unchanged. Mild atrophic changes. Moderate periventricular white matter hypoattenuation, likely due to small vessel ischemic disease. Remote lacunar infarct in right basal ganglia. ORBITS: The visualized portion of the orbits demonstrate no acute abnormality. SINUSES: Mild mucosal thickening in sphenoid sinus. SOFT TISSUES/SKULL:  No acute abnormality of the visualized skull or soft tissues. No acute intracranial abnormality. Ct Head Wo Contrast    Result Date: 5/31/2019  EXAMINATION: CT OF THE HEAD WITHOUT CONTRAST  5/31/2019 3:27 pm TECHNIQUE: CT of the head was performed without the administration of intravenous contrast. Dose modulation, iterative reconstruction, and/or weight based adjustment of the mA/kV was utilized to reduce the radiation dose to as low as reasonably achievable. COMPARISON: October 15, 2017 HISTORY: ORDERING SYSTEM PROVIDED HISTORY: head injury TECHNOLOGIST PROVIDED HISTORY: Has a \"code stroke\" or \"stroke alert\" been called? ->No Ordering Physician Provided Reason for Exam: Fall (pt brought in by [facility] from [facility]. Pt c/o headache after a witness mechanical fall this am at appox 0830. Did hit forehead. Denies LOC. Pt was monitored at facility and given tyelonel for pain. Pt is on blood thinners . At appox 1400 [facility] called 911 due to pt reporting a headache. Pt hx of Dementia. Squad and family reports pt at baseline.  A&O to self.) Acuity: Acute Type of Exam: Initial Mechanism of Injury: fall FINDINGS: BRAIN/VENTRICLES: There is no acute intracranial hemorrhage, mass effect or midline shift. No abnormal extra-axial fluid collection. The gray-white differentiation is maintained without evidence of an acute infarct. There is no evidence of hydrocephalus. Encephalomalacia related to remote infarct of the left frontal lobe again noted. ORBITS: The visualized portion of the orbits demonstrate no acute abnormality. SINUSES: The visualized paranasal sinuses and mastoid air cells demonstrate no acute abnormality. SOFT TISSUES/SKULL:  No acute abnormality of the visualized skull or soft tissues. No acute intracranial abnormality. Ct Cervical Spine Wo Contrast    Result Date: 6/9/2019  EXAMINATION: CT OF THE CERVICAL SPINE WITHOUT CONTRAST 6/9/2019 3:31 pm TECHNIQUE: CT of the cervical spine was performed without the administration of intravenous contrast. Multiplanar reformatted images are provided for review. Dose modulation, iterative reconstruction, and/or weight based adjustment of the mA/kV was utilized to reduce the radiation dose to as low as reasonably achievable. COMPARISON: 05/31/2019 HISTORY: ORDERING SYSTEM PROVIDED HISTORY: fall, head injury TECHNOLOGIST PROVIDED HISTORY: If patient is on cardiac monitor and/or pulse ox, they may be taken off cardiac monitor and pulse ox, left on O2 if currently on. All monitors reattached when patient returns to room. Ordering Physician Provided Reason for Exam: Fall (Pt. comes in by [facility] after she fell. Daughter was with pt. when she fell. Pt. felt dizzy prior to falling, daughter states she went backwards and hit her head on her bed.  Daughter also concerned becuase left hip is bothering her and had not had it looked at.) Acuity: Acute Type of Exam: Initial Mechanism of Injury: fall FINDINGS: BONES/ALIGNMENT: Multiple contiguous axial images were obtained of the cervical spine. Sagittal and coronal reformats were obtained. Multilevel disc space narrowing is seen with endplate osteophytosis, compatible with degenerative disc disease. Vacuum phenomenon at C4-C5 and C5-C6. No spondylolisthesis. Moderate spurring is seen at C1-C2, similar to prior. No marked vertebral body height loss. No gross fracture is seen. DEGENERATIVE CHANGES: As above SOFT TISSUES: Bilateral carotid artery calcification. Mucosal thickening of sphenoid sinus, incompletely imaged. Multilevel degenerative disc disease, without spondylolisthesis or gross fracture. Ct Cervical Spine Wo Contrast    Result Date: 5/31/2019  EXAMINATION: CT OF THE CERVICAL SPINE WITHOUT CONTRAST 5/31/2019 3:27 pm TECHNIQUE: CT of the cervical spine was performed without the administration of intravenous contrast. Multiplanar reformatted images are provided for review. Dose modulation, iterative reconstruction, and/or weight based adjustment of the mA/kV was utilized to reduce the radiation dose to as low as reasonably achievable. COMPARISON: None. HISTORY: ORDERING SYSTEM PROVIDED HISTORY: fall, injury TECHNOLOGIST PROVIDED HISTORY: Ordering Physician Provided Reason for Exam: Fall (pt brought in by [facility] from [facility]. Pt c/o headache after a witness mechanical fall this am at appox 0830. Did hit forehead. Denies LOC. Pt was monitored at facility and given tyelonel for pain. Pt is on blood thinners . At appox 1400 NH called 911 due to pt reporting a headache. Pt hx of Dementia. Squad and family reports pt at baseline. A&O to self.) Acuity: Acute Type of Exam: Initial Mechanism of Injury: fall FINDINGS: BONES/ALIGNMENT: No evidence of fracture or subluxation DEGENERATIVE CHANGES: Degenerative disc disease C4-C5 through C6-C7. Facet osteoarthritis C2-C3 on the right, and C3-C4 and C7-T1 on the left. SOFT TISSUES: There is no prevertebral soft tissue swelling.      No acute abnormality of the cervical chest demonstrates no lines or tubes. Cardiomediastinal silhouette is mildly prominent. The lungs are low in volume. Pulmonary edema is identified. No dense consolidation or pleural effusion. No pneumothorax. No acute osseous abnormality. 1. Mild pulmonary edema. Xr Hip 3-4 Vw W Pelvis Bilateral    Result Date: 6/9/2019  EXAMINATION: ONE XRAY VIEW OF THE PELVIS AND TWO XRAY VIEWS OF EACH OF THE BILATERAL HIPS 6/9/2019 3:24 pm COMPARISON: None. HISTORY: ORDERING SYSTEM PROVIDED HISTORY: bilateral pain TECHNOLOGIST PROVIDED HISTORY: Reason for exam:->bilateral pain Ordering Physician Provided Reason for Exam: recent fall, L side pain Acuity: Acute Type of Exam: Initial FINDINGS: Two views were obtained of the hips. No gross fracture. No dislocation. Mild sclerosis and spurring of the acetabula, in keeping with degenerative change. Pelvic phleboliths. No gross fracture. EKG:            MEDICAL DECISION MAKING:    Patient Active Hospital Problem List:   ARF (acute renal failure) ([facility] [address] (6/9/2019)    Assessment: New Problem to me. Pt with elevated creat. 1.7 in ER. Review of old chart shows baseline of 1.2 just two weeks ago. Plan: hold diuretics. Watch BP. Give IVF. Cons renal   High cholesterol (6/9/2019)    Assessment: Established problem. Stable. Plan: cont on home meds   HTN (hypertension) (6/9/2019)    Assessment: Established problem. Stable. bp OK in ER    Plan: will stop HCTZ. Cont ARB for now, but will need to watch renal fxn   Hypothyroid (6/9/2019)    Assessment: Established problem. Stable. Plan: Continue present orders/plan. Syncope (6/9/2019)    Assessment: New Problem to me. Pt with fall, poss head trauma. Pt is on anticoagulation. No bleed seen on initial scan. Plan: repeat CT ordered for tomorrow. PT/OT to see. Diagnoses as listed above, designated as new or established and plan outlined for each.      Data Reviewed:   (1) Lab tests were reviewed or ordered. (1) Radiology tests were reviewed or ordered. (1) Medical test (Echo, EKG, PFT/robina) were ordered. (1)History was obtained from someone other than patient  (1) Old records  were reviewed - see HPI/MDM for pertinent details if review done. (2) Case wasdiscussed with another health care provider: [facility] [place]  (2) Imaging was viewed by myself. Ct head  (2) EKG  was not viewed by myself. The patient isbeing placed in inpatient status with the expectation of requiring a hospital stay spanning at least two midnights for care and treatment of the problems noted in the problem list.  This determination is also based on thepatients comorbidities and past medical history, the severity and timing of the signs and symptoms upon presentation.         Electronically signed by: Anju Gutierrez 6/9/2019

## 2019-06-10 NOTE — ED NOTES
Report called to Anya Duque 51 RN. All questions answered. Will transfer pt to floor.       Silver Saleem RN  06/09/19 [id number]

## 2019-06-10 NOTE — PROGRESS NOTES
Physical Therapy    Facility/Department: [address] ONCOLOGY  Initial Assessment    NAME: Loni Gaines  : 1934  MRN: [id number]    Date of Service: 6/10/2019    Discharge Recommendations: Loni Gaines scored a  on the AM-PAC short mobility form. Current research shows that an AM-PAC score of 17 or less is typically not associated with a discharge to the patient's home setting. Based on the patients AM-PAC score and their current functional mobility deficits, it is recommended that the patient have 3-5 sessions per week of Physical Therapy at d/c to increase the patients independence. PT Equipment Recommendations  Equipment Needed: No    Assessment   Body structures, Functions, Activity limitations: Decreased functional mobility ; Decreased endurance;Decreased strength;Decreased balance;Vestibular Impairment; Increased Pain  Assessment: pt. presents with the above deficits below her baseline. pt. experiencing pain in distal femur and dizziness with sitting on EOB with a history of meniere's disease, limiting activity performed today. Before further treatment is performed requesting distal femur x-rays-- nurse notified.  pt. will benefit from continued skilled PT to improve overall function and safely return to PLOF   Treatment Diagnosis: impaired strength, balance, and endurance   Prognosis: Good  Decision Making: Medium Complexity  History: menieres's disease, prior stroke, DDD   Exam: fucntional mobility, balace, [facility]   Clinical Presentation: evolving   Patient Education: reason for PT eval, discharge plan   Barriers to Learning: expressive aphasia from prior stroke- cognitive   REQUIRES PT FOLLOW UP: Yes  Activity Tolerance  Activity Tolerance: Patient limited by pain  Activity Tolerance: session halted after pain with palpation to distal femur and heel tapping, suspected distal femur fx-- requested further x-rays before PT is continued        Patient Diagnosis(es): The primary encounter diagnosis was Fall, initial encounter. Diagnoses of Injury of head, initial encounter, Dizziness, Anticoagulated on Coumadin, and RADHA (acute kidney injury) ([facility] [address] were also pertinent to this visit. has a past medical history of Anemia, Hyperlipidemia, Hypertension, Thyroid disease, and Unspecified cerebral artery occlusion with cerebral infarction. has a past surgical history that includes Cholecystectomy and Hysterectomy. Restrictions  Restrictions/Precautions  Restrictions/Precautions: Fall Risk(high fall risk )  Required Braces or Orthoses?: No  Position Activity Restriction  Other position/activity restrictions: 80 y.o. female presents for evaluation after a witnessed fall that occurred just prior to arrival. Patient reports that she felt dizzy which caused her to fall backwards. She didn't hit her head on the edge of her bed and has since been complaining of headache, continued dizziness, back and bilateral hip pain, left greater than right. Daughter reports the patient has been complaining of hip pain for the past couple of weeks and has had difficulty getting and out of cars and ambulating. During my evaluation, patient also started reporting difficulty or changes in her vision. She does have history of dementia and aphasia, per daughter. Daughter reports that squad had told her initially that one pupil was larger than the other. She is anticoagulated on Coumadin and Plavix. She denies any numbness, tingling or weakness of her extremities. No saddle anesthesia, bladder retention or bowel incontinence. No chest pain or shortness of breath. No abdominal pain, nausea or vomiting. She has no other complaints or concerns at this time.   Vision/Hearing  Vision: Impaired  Vision Exceptions: Wears glasses at all times  Hearing: Within functional limits     Subjective  General  Chart Reviewed: Yes  Patient assessed for rehabilitation services?: Yes  Additional Pertinent Hx: HTN, anemia, DDD  Response To Previous Treatment: Not applicable  Diagnosis: dizziness/ fall  Follows Commands: Within Functional Limits  General Comment  Comments: pt. was supine in bed upon arrival  Subjective  Subjective: pt. was agreeable to PT on this date   Pain Screening  Patient Currently in Pain: Denies  Vital Signs  Patient Currently in Pain: Denies       Orientation  Orientation  Overall Orientation Status: Within Functional Limits(expressive aphasia, oriented but unable to express )  Social/Functional History  Social/Functional History  Lives With: Other (comment)(nursing house with shared bathroom )  Type of Home: Assisted living  Home Layout: One level  Home Access: Level entry  Bathroom Shower/Tub: Walk-in shower  Bathroom Toilet: Handicap height  Bathroom Accessibility: Accessible  Receives Help From: Personal care attendant  ADL Assistance: Needs assistance  Bath: Minimal assistance  Dressing: Minimal assistance(choosing outfit )  Homemaking Assistance: Needs assistance(prepared meals )  Homemaking Responsibilities: No  Ambulation Assistance: Independent  Transfer Assistance: Independent  Active : No  Patient's  Info: daughter   Mode of Transportation: Truck(trouble getting into larger car )  Education: [employer] of OT, therapy evaluation   Type of occupation:  35 years   Leisure & Hobbies: Used to walk around mall   Additional Comments: expressive aphasia d/t late affects of prior stroke     Objective     Observation/Palpation  Posture: Fair(only sitting posture was observed, rounded shoulders )  Observation: patient complained of pain with L hip and knee flexion and tapping on bottom of foot on left, noted x ray was of pelvis, recommend film coming distal on femur to include knee    AROM RLE (degrees)  RLE AROM: WFL  AROM LLE (degrees)  LLE AROM : WFL  Strength RLE  Strength RLE: WFL  Comment: grossly 4/5   Strength LLE  Comment: assessment discontinued due to pain with testing of hip flexion from pressure on dital femur-- pt. also noted pain with heel tapping on L foot, requesting further x-rays      Sensation  Overall Sensation Status: WFL  Bed mobility  Supine to Sit: Minimal assistance  Sit to Supine: 2 Person assistance(min A x2 due to leg pain and inability to lift them back into bed )  Scootin Person assistance  Comment: HOB raised, increased time required for bed mobility and vc for sequencing, dizziness reported at EOB with supine to sit      Ambulation  Ambulation?: No(not assessed due to distal femur pain with palpation and heel tapping-- holding treatment until further x-rays are performed)     Balance  Posture: Fair  Sitting - Static: Good  Sitting - Dynamic: Fair;+  Comments: standing balance not assessed due to distal femur pain with palpation and heel tapping-- holding treatment until further x-rays are performed        Plan   Plan  Times per week: 3-5  Times per day: Daily  Current Treatment Recommendations: Gait Training, Strengthening, Balance Training, Functional Mobility Training, Endurance Training, Transfer Training, Safety Education & Training, Vestibular Rehab  Safety Devices  Type of devices:  All fall risk precautions in place, Bed alarm in place, Call light within reach, Gait belt, Left in bed, Patient at risk for falls, Nurse notified  Restraints  Initially in place: No    AM-PAC Score  AM-PAC Inpatient Mobility Raw Score : 11 (06/10/19 1034)  AM-PAC Inpatient T-Scale Score : 33.86 (06/10/19 1034)  Mobility Inpatient CMS 0-100% Score: 72.57 (06/10/19 1034)  Mobility Inpatient CMS G-Code Modifier : CL (06/10/19 1034)          Goals  Short term goals  Time Frame for Short term goals: by discharge   Short term goal 1: pt. will perform bed mobility with mod I   Short term goal 2: pt. will perform transfers with CGA   Short term goal 3: pt. will ambulate 50ft with LRAD and CGA   Long term goals  Time Frame for Long term goals : STG=LTG  Patient Goals   Patient goals : to return home Therapy Time   Individual Concurrent Group Co-treatment   Time In 0816         Time Out 0842         Minutes 26         Timed Code Treatment Minutes: 11 Minutes        Jade Myers, SPT   PT observed and directed the above evaluation/treatment.   [address], DPT [id number]

## 2019-06-10 NOTE — PROGRESS NOTES
Pt alert looks at daughter for answers to questions. Tolerating diet well took all am medications whole with water. Pleasant affect. Prn tylenol given for leg pain. Iv fluids running pt resting ambulated to restroom earlier with standby assist. Call light in reach daughter at bedside.

## 2019-06-10 NOTE — PROGRESS NOTES
Patient admitted to room 5569 from the ED s/p fall from [facility], RADHA w/ blood workup. Daughter Dave Smith at bedside, able to help answer some admission questions. VSS. Patient rates the left side of her head an 8/10. Tylenol given. Oriented patient and Daughter to room, call light within reach, bed in low position, non skid socks on. Patient does have some expressive aphasia. No diet order, pt. Took meds with applesauce and water. Will place call for diet orders, pt/ot and speech with Dr. Sara Alvarado.     4eyes assessment done with Charge Angelina Wong.

## 2019-06-10 NOTE — PLAN OF CARE
Problem: Falls - Risk of:  Goal: Will remain free from falls  Description  Will remain free from falls  Outcome: Ongoing  Bed alarm active, family and patient oriented to room. Problem: Pain:  Goal: Pain level will decrease  Description  Pain level will decrease  Outcome: Ongoing     Problem: Confusion - Acute:  Goal: Absence of continued neurological deterioration signs and symptoms  Description  Absence of continued neurological deterioration signs and symptoms  Outcome: Ongoing  Patient is not oriented to place, situation or time. Patient knows her name but not     8400- Patient was caught up and out of bed. Didn't know where she was going. Problem: Confusion - Acute:  Goal: Mental status will be restored to baseline  Description  Mental status will be restored to baseline  Outcome: Ongoing     Problem: Injury - Risk of, Physical Injury:  Goal: Will remain free from falls  Description  Will remain free from falls  Outcome: Ongoing     Problem: Psychomotor Activity - Altered:  Goal: Absence of psychomotor disturbance signs and symptoms  Description  Absence of psychomotor disturbance signs and symptoms  Outcome: Ongoing     Problem: Sensory Perception - Impaired:  Goal: Demonstrations of improved sensory functioning will increase  Description  Demonstrations of improved sensory functioning will increase  Outcome: Ongoing     Problem: Sleep Pattern Disturbance:  Goal: Appears well-rested  Description  Appears well-rested  Outcome: Ongoing     Problem: Risk for Impaired Skin Integrity  Goal: Tissue integrity - skin and mucous membranes  Description  Structural intactness and normal physiological function of skin and  mucous membranes. Outcome: Ongoing  Mepilex placed on coccyx, patient has some blanchable spots between cheeks.

## 2019-06-10 NOTE — PROGRESS NOTES
0ml post void bladder scan at this time. Pt continent of urine. Denies any dysuria or urgency.  Fluids encouaged

## 2019-06-11 PROBLEM — N28.9 ACUTE KIDNEY INSUFFICIENCY: Status: ACTIVE | Noted: 2019-01-01

## 2019-06-11 NOTE — PROGRESS NOTES
[facility] GI  Gastroenterology Progress Note    Sonal Morrison is a 80 y.o. female patient. Principal Problem:    ARF (acute renal failure) (HCC)  Active Problems:    High cholesterol    HTN (hypertension)    Hypothyroid    Syncope    RADHA (acute kidney injury) ([address]    Acute kidney insufficiency  Resolved Problems:    * No resolved hospital problems. *      SUBJECTIVE:  Tried eating lunch but then vomited after taking a few bites. Had 2 BMs yesterday with lactulose but none since. Daughter at bedside and reports pt is confused. ROS:  Unable to obtain    Physical    VITALS:  /75   Pulse 96   Temp 98.8 °F (37.1 °C) (Oral)   Resp 16   Ht 5' 4\" (1.626 m)   Wt 203 lb 14.4 oz (92.5 kg)   SpO2 94%   BMI 35.00 kg/m²   TEMPERATURE:  Current - Temp: 98.8 °F (37.1 °C); Max - Temp  Av.5 °F (36.9 °C)  Min: 98.1 °F (36.7 °C)  Max: 98.8 °F (37.1 °C)    NAD  Regular rate   Lungs CTA Bilaterally  Abdomen soft, ND, NT,  Bowel sounds normal.  Awakens, confused but has aphasia at baseline    Data    Data Review:    Recent Labs     19  1616 06/10/19  0540 19  0552   WBC 5.5 3.3* 6.6   HGB 13.2 12.5 13.2   HCT 40.2 37.0 39.6   .3* 101.4* 101.2*   * 77* 82*     Recent Labs     19  1616 06/10/19  0540 19  0552    145 144   K 4.1 4.5 4.1    114* 113*   CO2 23 19* 20*   PHOS  --   --  2.8   BUN 20 24* 23*   CREATININE 1.7* 2.0* 1.7*     Recent Labs     19   AST 39*   ALT 25   BILITOT 0.9   ALKPHOS 112     No results for input(s): LIPASE, AMYLASE in the last 72 hours. Recent Labs     19  1616 06/10/19  0540 19  0552   PROTIME 23.6* 24.5* 22.5*   INR 2.07* 2.15* 1.97*     No results for input(s): PTT in the last 72 hours.     US: 6/10/19     FINDINGS:   LIVER:  The liver demonstrates normal echogenicity without evidence of   intrahepatic biliary ductal dilatation.       BILIARY SYSTEM:  Prior cholecystectomy.       Common bile duct is within normal limits measuring 6 mm.       KIDNEYS: Right kidney measures 8.3 cm.  Left kidney measures 9.5 cm.  No   hydronephrosis.  Focal lesion or calculus.       BLADDER: Unremarkable appearance of the bladder.       PANCREAS:  Visualized portions of the pancreas are unremarkable.       OTHER: No evidence of right upper quadrant ascites. ASSESSMENT :    Cirrhosis - d/t KRAUS. Followed by Dr Klaus Foley. No prior ascites requiring paracentesis. No GI bleeding. H/o hepatic encephalopathy. f-actin was weakly positive in the past.   Elevated ammonia - NH3 elevated at 83. She is confused but has dementia and aphasia. reports compliance with xifaxan. Does not seem to be compliant with lactulose as she is only having 1-2 BMs daily rather than 3 as previously recommended. Noncompliance or RADHA could have precipitated this. No UTI or pneumonia. No ascites on US. RADHA - nephrology following.    H/o falls  H/o CVA with aphasia         PLAN :  - lactulose TID and titrate for goal of 3 BMs daily  - xifaxan BID  - f/u repeat f-actin  - check axr     Discussed with Dr. Kaleigh Clark, [address] and [address] [facility] [address]

## 2019-06-11 NOTE — PROGRESS NOTES
Speech Language Pathology  Dysphagia Treatment Note    Name:  Imelda Lo  :   1934  Medical Diagnosis:  RADHA (acute kidney injury) ([facility] [address] [N17.9]  RADHA (acute kidney injury) ([facility] [address] [N17.9]  Acute kidney insufficiency [N28.9]  Treatment Diagnosis: Oropharyngeal Dysphagia  Pain level: none reported    Current Diet Level: Regular texture diet with thin liquids, meds in puree  Tolerance of Current Diet Level: Per RN and pt's daughter, no difficulty with current diet ntoed    Assessment of Texture Tolerance:  -Impressions: Pt sleeping in bed upon SLP entry to room with daughter present at bedside. Pt awakened to verbal cues but needed some reminders for alertness. SLP elevated head of bed so pt was more upright; however, pt did not want to lay on her back, so she was not completely upright at 90 degrees. Per daughter, pt ate a few bites of her lunch then vomited, so she has been sleeping since. Pt initially indicated she wanted to try some bites of cracker, but she spit out bolus when presented. She did take a few sips of thin liquid water via cup. Audible swallow was noted--suspect premature spillage to pharynx; delayed swallow initiation also noted. Vocal quality remained clear, and there were no overt s/s aspiration.  Educated pt's daughter re: s/s aspiration and swallow precautions.    -Compensatory strategies:  90 degree positioning with all p.o. intake; small bites/sips; alternate textures through meal; reduce rate of intake    Diet and Treatment Recommendations:  Continue regular texture diet with thin liquids, meds in puree    ST.) Pt will tolerate recommended diet without s/s of aspiration (ongoing, 19)  2.) If clinical symptoms of penetration/aspiration continue to be noted,Pt will tolerate MBS to r/o aspiration and determine appropriate diet/liquid level. (ongoing, 19)  3.) Pt will improve oral motor function via bolus control exercises / (ongoing, 19)    Plan:  Continued daily

## 2019-06-11 NOTE — PROGRESS NOTES
Progress Note - Dr. Maricel Mcintosh - Internal Medicine  PCP: Angelita Halsted,  [facility] / [address] [place] [address] [phone]    Hospital Day: 2  Code Status: DNR-CC  Current Diet: DIET RENAL; Low Sodium (2 GM)        CC: follow up on medical issues    Subjective:   Jackie Manzano is a 80 y.o. female. She denies problems    About the same  Creat back down to 1.7  Appreciate renal and gi eval  Does not have complaints of pain        She denies chest pain, denies shortness of breath, denies nausea,  denies emesis. 10 system Review of Systems is reviewed with patient, and pertinent positives are listed here: None . Otherwise, Review of systems is negative. I have reviewed the patient's medical and social history in detail and updated the computerized patient record. To recap: She  has a past medical history of Anemia, Hyperlipidemia, Hypertension, Thyroid disease, and Unspecified cerebral artery occlusion with cerebral infarction. . She  has a past surgical history that includes Cholecystectomy and Hysterectomy. . She  reports that she has never smoked. She has never used smokeless tobacco. She reports that she does not drink alcohol or use drugs. .        Active Hospital Problems    Diagnosis Date Noted    [facility] [N17.9] 06/09/2019    High cholesterol [E78.00] 06/09/2019    HTN (hypertension) [I10] 06/09/2019    Hypothyroid [E03.9] 06/09/2019    Syncope [R55] 06/09/2019    RADHA (acute kidney injury) ([facility] [N17.9] 06/09/2019       Current Facility-Administered Medications: 0.9 % sodium chloride infusion, , Intravenous, Continuous  sodium chloride flush 0.9 % injection 10 mL, 10 mL, Intravenous, 2 times per day  sodium chloride flush 0.9 % injection 10 mL, 10 mL, Intravenous, PRN  docusate sodium (COLACE) capsule 100 mg, 100 mg, Oral, BID  ondansetron (ZOFRAN) injection 4 mg, 4 mg, Intravenous, Q6H PRN  gabapentin (NEURONTIN) capsule 100 mg, 100 mg, Oral, TID  amLODIPine (NORVASC) 205 lb (93 kg)           Intake/Output Summary (Last 24 hours) at 6/11/2019 0759  Last data filed at 6/10/2019 1634  Gross per 24 hour   Intake 720 ml   Output 650 ml   Net 70 ml         Physical Exam:   S1, S2 normal, no murmur, rub or gallop, regular rate and rhythm  clear to auscultation bilaterally  abdomen is soft without significant tenderness, masses, organomegaly or guarding  extremities normal, atraumatic, no cyanosis or edema    Labs:  Lab Results   Component Value Date    WBC 6.6 06/11/2019    HGB 13.2 06/11/2019    HCT 39.6 06/11/2019    PLT 82 (L) 06/11/2019    CHOL 137 01/18/2012    TRIG 144 01/18/2012    HDL 37 (L) 01/18/2012    ALT 25 06/09/2019    AST 39 (H) 06/09/2019     06/11/2019    K 4.1 06/11/2019     (H) 06/11/2019    CREATININE 1.7 (H) 06/11/2019    BUN 23 (H) 06/11/2019    CO2 20 (L) 06/11/2019    TSH 3.31 01/18/2012    INR 1.97 (H) 06/11/2019    LABA1C 5.4 01/18/2012    LABMICR YES 06/10/2019     Lab Results   Component Value Date    CKTOTAL 65 06/10/2019    TROPONINI <0.01 06/09/2019       Recent Imaging Results are Reviewed:  Ct Head Wo Contrast    Result Date: 6/10/2019  EXAMINATION: CT OF THE HEAD WITHOUT CONTRAST  6/10/2019 5:27 pm TECHNIQUE: CT of the head was performed without the administration of intravenous contrast. Dose modulation, iterative reconstruction, and/or weight based adjustment of the mA/kV was utilized to reduce the radiation dose to as low as reasonably achievable. COMPARISON: 06/09/2019 and 05/31/2019 HISTORY: ORDERING SYSTEM PROVIDED HISTORY: ABNORMAL GAIT (ATAXIA) TECHNOLOGIST PROVIDED HISTORY: Has a \"code stroke\" or \"stroke alert\" been called? ->Yes Ordering Physician Provided Reason for Exam: Abnormal gait (ataxia); Stroke Acuity: Unknown Type of Exam: Unknown FINDINGS: BRAIN/VENTRICLES: There is generalized atrophy. There is unchanged left frontal lobe encephalomalacia consistent with a remote infarct.   There is patchy periventricular and The gray-white differentiation is maintained without evidence of an acute infarct. There is no evidence of hydrocephalus. Encephalomalacia in the left frontal lobe is unchanged. Mild atrophic changes. Moderate periventricular white matter hypoattenuation, likely due to small vessel ischemic disease. Remote lacunar infarct in right basal ganglia. ORBITS: The visualized portion of the orbits demonstrate no acute abnormality. SINUSES: Mild mucosal thickening in sphenoid sinus. SOFT TISSUES/SKULL:  No acute abnormality of the visualized skull or soft tissues. No acute intracranial abnormality. Ct Head Wo Contrast    Result Date: 5/31/2019  EXAMINATION: CT OF THE HEAD WITHOUT CONTRAST  5/31/2019 3:27 pm TECHNIQUE: CT of the head was performed without the administration of intravenous contrast. Dose modulation, iterative reconstruction, and/or weight based adjustment of the mA/kV was utilized to reduce the radiation dose to as low as reasonably achievable. COMPARISON: October 15, 2017 HISTORY: ORDERING SYSTEM PROVIDED HISTORY: head injury TECHNOLOGIST PROVIDED HISTORY: Has a \"code stroke\" or \"stroke alert\" been called? ->No Ordering Physician Provided Reason for Exam: Fall (pt brought in by [facility] from [facility]. Pt c/o headache after a witness mechanical fall this am at appox 0830. Did hit forehead. Denies LOC. Pt was monitored at facility and given tyelonel for pain. Pt is on blood thinners . At appox 1400 [facility] called 911 due to pt reporting a headache. Pt hx of Dementia. Squad and family reports pt at baseline. A&O to self.) Acuity: Acute Type of Exam: Initial Mechanism of Injury: fall FINDINGS: BRAIN/VENTRICLES: There is no acute intracranial hemorrhage, mass effect or midline shift. No abnormal extra-axial fluid collection. The gray-white differentiation is maintained without evidence of an acute infarct. There is no evidence of hydrocephalus.  Encephalomalacia related to remote infarct of the left frontal lobe again noted. ORBITS: The visualized portion of the orbits demonstrate no acute abnormality. SINUSES: The visualized paranasal sinuses and mastoid air cells demonstrate no acute abnormality. SOFT TISSUES/SKULL:  No acute abnormality of the visualized skull or soft tissues. No acute intracranial abnormality. Ct Cervical Spine Wo Contrast    Result Date: 6/9/2019  EXAMINATION: CT OF THE CERVICAL SPINE WITHOUT CONTRAST 6/9/2019 3:31 pm TECHNIQUE: CT of the cervical spine was performed without the administration of intravenous contrast. Multiplanar reformatted images are provided for review. Dose modulation, iterative reconstruction, and/or weight based adjustment of the mA/kV was utilized to reduce the radiation dose to as low as reasonably achievable. COMPARISON: 05/31/2019 HISTORY: ORDERING SYSTEM PROVIDED HISTORY: fall, head injury TECHNOLOGIST PROVIDED HISTORY: If patient is on cardiac monitor and/or pulse ox, they may be taken off cardiac monitor and pulse ox, left on O2 if currently on. All monitors reattached when patient returns to room. Ordering Physician Provided Reason for Exam: Fall (Pt. comes in by [facility] after she fell. Daughter was with pt. when she fell. Pt. felt dizzy prior to falling, daughter states she went backwards and hit her head on her bed. Daughter also concerned becuase left hip is bothering her and had not had it looked at.) Acuity: Acute Type of Exam: Initial Mechanism of Injury: fall FINDINGS: BONES/ALIGNMENT: Multiple contiguous axial images were obtained of the cervical spine. Sagittal and coronal reformats were obtained. Multilevel disc space narrowing is seen with endplate osteophytosis, compatible with degenerative disc disease. Vacuum phenomenon at C4-C5 and C5-C6. No spondylolisthesis. Moderate spurring is seen at C1-C2, similar to prior. No marked vertebral body height loss. No gross fracture is seen.  DEGENERATIVE CHANGES: As above SOFT TISSUES: Bilateral carotid artery calcification. Mucosal thickening of sphenoid sinus, incompletely imaged. Multilevel degenerative disc disease, without spondylolisthesis or gross fracture. Ct Cervical Spine Wo Contrast    Result Date: 5/31/2019  EXAMINATION: CT OF THE CERVICAL SPINE WITHOUT CONTRAST 5/31/2019 3:27 pm TECHNIQUE: CT of the cervical spine was performed without the administration of intravenous contrast. Multiplanar reformatted images are provided for review. Dose modulation, iterative reconstruction, and/or weight based adjustment of the mA/kV was utilized to reduce the radiation dose to as low as reasonably achievable. COMPARISON: None. HISTORY: ORDERING SYSTEM PROVIDED HISTORY: fall, injury TECHNOLOGIST PROVIDED HISTORY: Ordering Physician Provided Reason for Exam: Fall (pt brought in by [facility] from [facility]. Pt c/o headache after a witness mechanical fall this am at appox 0830. Did hit forehead. Denies LOC. Pt was monitored at facility and given tyelonel for pain. Pt is on blood thinners . At appox 1400 NH called 911 due to pt reporting a headache. Pt hx of Dementia. Squad and family reports pt at baseline. A&O to self.) Acuity: Acute Type of Exam: Initial Mechanism of Injury: fall FINDINGS: BONES/ALIGNMENT: No evidence of fracture or subluxation DEGENERATIVE CHANGES: Degenerative disc disease C4-C5 through C6-C7. Facet osteoarthritis C2-C3 on the right, and C3-C4 and C7-T1 on the left. SOFT TISSUES: There is no prevertebral soft tissue swelling. No acute abnormality of the cervical spine. Ct Lumbar Spine Wo Contrast    Result Date: 6/9/2019  EXAMINATION: CT OF THE LUMBAR SPINE WITHOUT CONTRAST,  6/9/2019 TECHNIQUE: CT of the lumbar spine was performed without the administration of intravenous contrast. Multiplanar reformatted images are provided for review.  Dose modulation, iterative reconstruction, and/or weight based adjustment of the mA/kV was utilized to reduce the radiation dose to as low as reasonably achievable. COMPARISON: None HISTORY: ORDERING SYSTEM PROVIDED HISTORY: Fall, pain TECHNOLOGIST PROVIDED HISTORY: Reason for exam:->Fall, pain Ordering Physician Provided Reason for Exam: Fall (Pt. comes in [facility] home after she fell. Daughter was with pt. when she fell. Pt. felt dizzy prior to falling, daughter states she went backwards and hit her head on her bed. Daughter also concerned because left hip is bothering her and had not had it looked at.) Acuity: Acute Type of Exam: Initial Mechanism of Injury: Fall FINDINGS: BONES/ALIGNMENT: No acute fracture of the lumbar spine. Anterolisthesis of L4 on L5 measures 6 mm. DEGENERATIVE CHANGES: Severe degenerative disc disease is identified at L2-3. Moderate degenerative disc disease is identified at L3-4 and L5-S1. Findings are associated with endplate sclerosis and spurring. Severe facet arthropathy is seen at L3-4, L4-5, and L5-S1. SOFT TISSUES/RETROPERITONEUM: No paraspinal mass is seen. Other: Severe atherosclerosis. 1. No acute osseous abnormality of the lumbar spine. 2. Multilevel degenerative changes as described. Us Renal Complete    Result Date: 6/10/2019  EXAMINATION: RIGHT UPPER QUADRANT ULTRASOUND 6/10/2019 2:18 pm COMPARISON: CT 08/13/2016 HISTORY: ORDERING SYSTEM PROVIDED HISTORY: cirrhosis, eval for ascites, eval for liver mass TECHNOLOGIST PROVIDED HISTORY: Reason for exam:->cirrhosis, eval for ascites, eval for liver mass Ordering Physician Provided Reason for Exam: cirrhosis/Eval for liver mass and ascites Acuity: Chronic Type of Exam: Initial Additional signs and symptoms: GB removed 1970s Relevant Medical/Surgical History: na FINDINGS: LIVER:  The liver demonstrates normal echogenicity without evidence of intrahepatic biliary ductal dilatation. BILIARY SYSTEM:  Prior cholecystectomy. Common bile duct is within normal limits measuring 6 mm.  KIDNEYS: Right kidney Unremarkable appearance of the bladder. PANCREAS:  Visualized portions of the pancreas are unremarkable. OTHER: No evidence of right upper quadrant ascites. Unremarkable right upper quadrant ultrasound. If there is concern for liver mass consider liver protocol MRI. No hydronephrosis. Xr Hip 3-4 Vw W Pelvis Bilateral    Result Date: 6/9/2019  EXAMINATION: ONE XRAY VIEW OF THE PELVIS AND TWO XRAY VIEWS OF EACH OF THE BILATERAL HIPS 6/9/2019 3:24 pm COMPARISON: None. HISTORY: ORDERING SYSTEM PROVIDED HISTORY: bilateral pain TECHNOLOGIST PROVIDED HISTORY: Reason for exam:->bilateral pain Ordering Physician Provided Reason for Exam: recent fall, L side pain Acuity: Acute Type of Exam: Initial FINDINGS: Two views were obtained of the hips. No gross fracture. No dislocation. Mild sclerosis and spurring of the acetabula, in keeping with degenerative change. Pelvic phleboliths. No gross fracture. Assessment and Plan:  Patient Active Hospital Problem List:   ARF (acute renal failure) ([facility] (6/9/2019)    Assessment: Established problem. Stable. Creat 1.7    Plan: cont fluids   High cholesterol (6/9/2019)    Assessment: Established problem. Stable. Plan: Continue present orders/plan. HTN (hypertension) (6/9/2019)    Assessment: Established problem. Stable. 149/81    Plan: stay on same meds   Hypothyroid (6/9/2019)    Assessment: Established problem. Stable. Plan: Continue present orders/plan. Syncope (6/9/2019)    Assessment: Established problem. Stable.   No recurence    Plan: pt/ot recs SNF   RADHA (acute kidney injury) ([facility] (6/9/2019)              Renaye Brace  6/11/2019

## 2019-06-11 NOTE — PROGRESS NOTES
Anticoagulated on Coumadin, and RADHA (acute kidney injury) ([facility] were also pertinent to this visit. has a past medical history of Anemia, Hyperlipidemia, Hypertension, Thyroid disease, and Unspecified cerebral artery occlusion with cerebral infarction. has a past surgical history that includes Cholecystectomy and Hysterectomy. Restrictions  Restrictions/Precautions  Restrictions/Precautions: Fall Risk(high fall risk )  Required Braces or Orthoses?: No  Position Activity Restriction  Other position/activity restrictions: 80 y.o. female presents for evaluation after a witnessed fall that occurred just prior to arrival. Patient reports that she felt dizzy which caused her to fall backwards. She didn't hit her head on the edge of her bed and has since been complaining of headache, continued dizziness, back and bilateral hip pain, left greater than right. Daughter reports the patient has been complaining of hip pain for the past couple of weeks and has had difficulty getting and out of cars and ambulating. During my evaluation, patient also started reporting difficulty or changes in her vision. She does have history of dementia and aphasia, per daughter. Daughter reports that squad had told her initially that one pupil was larger than the other. She is anticoagulated on Coumadin and Plavix. She denies any numbness, tingling or weakness of her extremities. No saddle anesthesia, bladder retention or bowel incontinence. No chest pain or shortness of breath. No abdominal pain, nausea or vomiting. She has no other complaints or concerns at this time. Subjective   General  Chart Reviewed: Yes  Additional Pertinent Hx: HTN, anemia, DDD  Response To Previous Treatment: Patient with no complaints from previous session.   Subjective  Subjective: pt. was agreeable after encouragement to PT on this date, feeling nausea after lunch   General Comment  Comments: pt. was up in chair upon arrival  Pain Screening  Patient Currently in Pain: Denies  Vital Signs  Patient Currently in Pain: Denies       Orientation  Orientation  Overall Orientation Status: Impaired(expressive aphasia)  Objective   Bed mobility  Sit to Supine: Dependent/Total(max Ax2 )  Scooting: Dependent/Total(max Ax2 )  Comment: HOB flat, pt. attempted to go into bed head first, max Ax2 to correct position, pt. not responsive to vc   Transfers  Sit to Stand: Moderate Assistance(pt. not wishing to use RW, mod A to get bottom off chair )  Ambulation  Ambulation?: Yes  More Ambulation?: No  Ambulation 1  Surface: level tile  Device: Hand-Held Assist  Assistance:  Moderate assistance  Quality of Gait: slow pawan, decreased step length, unsteady, impulsive  Distance: 4 steps to bed   Comments: pt. wishing to return to bed, non responsive to vc, pt. impulsive with decisions getting up from chair and into bed   Stairs/Curb  Stairs?: No     Balance  Posture: Fair  Sitting - Static: Fair;+  Sitting - Dynamic: Fair  Standing - Static: Poor  Standing - Dynamic: Poor  Comments: pt. stood from chair, leaning onto me for stability with hand held assist   Exercises  Quad Sets: x10 B  Hip Flexion: 10x active assistive marches B   Knee Long Arc Quad: 10x B   Ankle Pumps: 10x B   Comments: pt. had trouble understanding directions and was not responding to cues, lots of repetition and demonstration required to complete task                      AM-PAC Score  AM-PAC Inpatient Mobility Raw Score : 12 (06/11/19 1406)  AM-PAC Inpatient T-Scale Score : 35.33 (06/11/19 1406)  Mobility Inpatient CMS 0-100% Score: 68.66 (06/11/19 1406)  Mobility Inpatient CMS G-Code Modifier : CL (06/11/19 1406)          Goals  Short term goals  Time Frame for Short term goals: by discharge   Short term goal 1: pt. will perform bed mobility with mod I   Short term goal 2: pt. will perform transfers with CGA   Short term goal 3: pt. will ambulate 50ft with LRAD and CGA   Long term goals  Time Frame for Long

## 2019-06-11 NOTE — PROGRESS NOTES
Pt drank 2nd dose lactulose at this time. No Bm this shift. Pt more alert daughter feeding her dinner at bedside.  Call light in reach

## 2019-06-11 NOTE — FLOWSHEET NOTE
Assessment complete. VSS. Patient resting in bed. Respirations even and easy. Call light in reach. No needs expressed at this time. Will continue to monitor.         06/10/19 2111   Vital Signs   Temp 98.1 °F (36.7 °C)   Temp Source Oral   Pulse 66   Heart Rate Source Monitor   Resp 16   /71   BP Location Left upper arm   BP Upper/Lower Upper   MAP (mmHg) 82   Patient Position Lying right side   Level of Consciousness 0   MEWS Score 1   Patient Currently in Pain Denies   Pain Assessment   Pain Assessment 0-10   Pain Level 0   Oxygen Therapy   SpO2 94 %   Pulse Oximeter Device Mode Intermittent   Pulse Oximeter Device Location Finger   O2 Device None (Room air)

## 2019-06-11 NOTE — PROGRESS NOTES
Nephrology Progress Note  [phone]   [email or web address].        Reason for Consult:  RADHA    HISTORY OF PRESENT ILLNESS:                This is a patient with significant past medical history of RADHA in 2016 with epak Scr 1.7-baseline Scr 1.0-1.2, CKD stage 3, HTN, CVA who presented to ER from NH post fall. Pt reports she felt dizzy and fell backwards. She was noted to have RADHA with Scr 1.7-last cr on 05/31/2019 is 1.2. She is on ARB for HTN. Daughter denies h/o NSAIDs. CT of head negative for CVA/acute bleed. Appetite has been good, no h/o N/v/D/Cp or SOB. No difficulty urinating. No hematuria, urgency, frequency. Subjective:  -pt seen and examined  -PMSHx and meds reviewed  -family at bedside    No acute events ON  SCr stable  BP variable    ROS: neg chest pain/SOB    PHYSICAL EXAM:    Vitals:    BP (!) 149/81   Pulse 87   Temp 98.7 °F (37.1 °C) (Oral)   Resp 16   Ht 5' 4\" (1.626 m)   Wt 203 lb 14.4 oz (92.5 kg)   SpO2 95%   BMI 35.00 kg/m²   I/O last 3 completed shifts: In: 720 [P.O.:720]  Out: 650 [Urine:650]  No intake/output data recorded. Physical Exam:  Gen: Resting in bed, NAD. Well developed, well nourished  HEENT: MMM, OP clear. Anicteric, NC/AT  CV: +S1/2,  RRR  Lungs: Good respiratory effort, clear air entry   Abd: S/NT +BS  Ext: No edema, no cyanosis  Skin: Warm. No rashes appreciated. : No TTP over bladder, nondistended. Neuro: Alert and oriented x 3, nonfocal.  Joints: No erythema noted over joints.     DATA:    CBC:   Lab Results   Component Value Date    WBC 6.6 06/11/2019    RBC 3.92 06/11/2019    HGB 13.2 06/11/2019    HCT 39.6 06/11/2019    .2 06/11/2019    MCH 33.8 06/11/2019    MCHC 33.4 06/11/2019    RDW 14.3 06/11/2019    PLT 82 06/11/2019    MPV 9.6 06/11/2019     BMP:    Lab Results   Component Value Date     06/11/2019    K 4.1 06/11/2019    K 5.1 05/31/2019     06/11/2019    CO2 20 06/11/2019    BUN 23 06/11/2019    LABALBU 3.4 06/11/2019    CREATININE 1.7 06/11/2019    CALCIUM 9.1 06/11/2019    GFRAA 35 06/11/2019    GFRAA >60 01/18/2012    LABGLOM 29 06/11/2019    GLUCOSE 99 06/11/2019        KIDNEYS: Right kidney measures 8.3 cm.  Left kidney measures 9.5 cm.  No   hydronephrosis.  Focal lesion or calculus.       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 Etiology -UA is bland. ? Pre-renal, R/o urinary retention. ? HRS given h/o cirrhosis.   UPC is 0.18, urine lytes c/w tubular injury  -SPEP/UPE is pending  -Ck is normal  -Scr is improved to 1.7-  -continue to hold ARB  -decreased dose of gabapentin  -renal US is normal  -decrease IVF 50ml/hr-appetite is poor  -continue supportive care    FEN: Na is 144<--145  MA: due to RADHA-monitor-improved    CKD stage 3: due to RADHA/presumed HTn SN  -SPPE/UPEP/renal US pending    Fall: PT/OT/consider neurology consult if recurrent  -CT of head is negative    HTN: changed losartan to amlodipine-increase dose    H/o cirrhosis: ? etiologyDiagnosed in 2016   -F actin was elevated-  -per GI-lactulose added  -on Rifaximin    Thrombocytopenia: chronic-attributed to cirrhosis-stable        Kiah Mobley MD

## 2019-06-11 NOTE — PLAN OF CARE
Problem: Falls - Risk of:  Goal: Will remain free from falls  Description  Will remain free from falls  6/11/2019 0312 by Jose Angel Walter RN  Outcome: Ongoing  Free of falls. Problem: Falls - Risk of:  Goal: Absence of physical injury  Description  Absence of physical injury  6/11/2019 0312 by Jose Angel Walter RN  Outcome: Ongoing  Patient's alarm went off a couple of tines but patient was alert and oriented. Problem: Pain:  Goal: Pain level will decrease  Description  Pain level will decrease  6/11/2019 0312 by Jose Angel Walter RN  Outcome: Ongoing  Denies any pain at the moment.      Problem: Confusion - Acute:  Goal: Absence of continued neurological deterioration signs and symptoms  Description  Absence of continued neurological deterioration signs and symptoms  6/11/2019 0312 by Jose Angel Walter RN  Outcome: Ongoing     Problem: Injury - Risk of, Physical Injury:  Goal: Will remain free from falls  Description  Will remain free from falls  6/11/2019 0312 by Jose Angel Walter RN  Outcome: Ongoing     Problem: Sensory Perception - Impaired:  Goal: Demonstrations of improved sensory functioning will increase  Description  Demonstrations of improved sensory functioning will increase  6/11/2019 0312 by Jose Angel Walter RN  Outcome: Ongoing     Problem: Sensory Perception - Impaired:  Goal: Decrease in sensory misperception frequency  Description  Decrease in sensory misperception frequency  6/11/2019 0312 by Jose Angel Walter RN  Outcome: Ongoing     Problem: Sensory Perception - Impaired:  Goal: Able to refrain from responding to false sensory perceptions  Description  Able to refrain from responding to false sensory perceptions  6/11/2019 0312 by Jose Angel Walter RN  Outcome: Ongoing     Problem: Sensory Perception - Impaired:  Goal: Demonstrates accurate environmental perceptions  Description  Demonstrates accurate environmental perceptions  6/11/2019 0312 by Jose Angel Walter RN  Outcome: Ongoing     Problem: Sleep Pattern Disturbance:  Goal: Appears well-rested  Description  Appears well-rested  6/11/2019 0312 by Saeed Carlson RN  Outcome: Ongoing     Problem: Risk for Impaired Skin Integrity  Goal: Tissue integrity - skin and mucous membranes  Description  Structural intactness and normal physiological function of skin and  mucous membranes. 6/11/2019 0312 by Saeed Calrson RN  Outcome: Ongoing  Patient able to turn self and is up with assistance.      Problem: Musculor/Skeletal Functional Status  Goal: Highest potential functional level  6/11/2019 0312 by Saeed Carlson RN  Outcome: Ongoing

## 2019-06-12 NOTE — DISCHARGE INSTR - COC
Continuity of Care Form    Patient Name: Tram Nichols   :  1934  MRN:  [id number]    Admit date:  2019  Discharge date: 2019    Code Status Order: [facility]   Advance Directives:   [facility] Documentation     Date/Time Healthcare Directive Type of Healthcare Directive Copy in [address] Agent's Name Healthcare Agent's Phone Number    06/10/19 0218  Yes, patient has an advance directive for healthcare treatment  --  --  --  --  --          Admitting Physician:  Isra Mcgee MD  PCP: Isa Pickard MD    Discharging Nurse: LORRIE  [facility] Unit/Room#: 9KK-1623/5911-54  Discharging Unit Phone Number: [phone]    Emergency Contact:   Extended Emergency Contact Information  Primary Emergency Contact: Jose Alvareztt  Address: [address] [place] [address] Phone: [phone]  Mobile Phone: [phone]  Relation: Child    Past Surgical History:  Past Surgical History:   Procedure Laterality Date    CHOLECYSTECTOMY      HYSTERECTOMY         Immunization History:   Immunization History   Administered Date(s) Administered    Tdap (Boostrix, Adacel) 2015       Active Problems:  Patient Active Problem List   Diagnosis Code    Lactic acidosis E87.2    Gastroenteritis, acute K52.9    ARF (acute renal failure) ([facility] [address] N17.9    High cholesterol E78.00    HTN (hypertension) I10    Hypothyroid E03.9    Syncope R55    [facility] N[facility]    Acute kidney insufficiency N28.9       Isolation/Infection:   Isolation          No Isolation            Nurse Assessment:  Last Vital Signs: /74   Pulse 67   Temp 98.1 °F (36.7 °C) (Oral)   Resp 20   Ht 5' 4\" (1.626 m)   Wt 202 lb 3.2 oz (91.7 kg)   SpO2 93%   BMI 34.71 kg/m²     Last documented pain score (0-10 scale): Pain Level: 0  Last Weight:   Wt Readings from Last 1 Encounters:   19 202 lb 3.2 oz (91.7 kg) Potential (if transferring to Rehab): Good    Recommended Labs or Other Treatments After Discharge: cbc. Bmp, lft, ammonia in 1 wk. Physician Certification: I certify the above information and transfer of Niru Potter  is necessary for the continuing treatment of the diagnosis listed and that she requires [facility] for greater 30 days.      Update Admission H&P: No change in H&P    PHYSICIAN SIGNATURE:  Electronically signed by Paul White MD on 6/12/19 at 7:56 AM

## 2019-06-12 NOTE — PROGRESS NOTES
CREATININE 1.4 06/12/2019    CALCIUM 8.7 06/12/2019    GFRAA 43 06/12/2019    GFRAA >60 01/18/2012    LABGLOM 36 06/12/2019    GLUCOSE 99 06/12/2019        KIDNEYS: Right kidney measures 8.3 cm.  Left kidney measures 9.5 cm.  No   hydronephrosis.  Focal lesion or calculus.       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 Etiology -UA is bland. ? Pre-renal, R/o urinary retention. ? HRS given h/o cirrhosis. UPC is 0.18, urine lytes c/w tubular injury  -SPEP is negative for M-spike  -Ck is normal  -Scr is improved to 1.4  -decreased dose of gabapentin  -renal US is normal  -continue supportive care    OK to discharge from nephrology standpoint.   Follow up will be arranged with me    FEN: Na is 142<-- 144<--145  MA: due to RADHA-monitor-improved    CKD stage 3: due to RADHA/presumed HTn SN  -SPPE/UPEP/renal US as noted above    Fall: PT/OT/consider neurology consult if recurrent  -CT of head is negative    HTN: changed losartan to amlodipine-increased dose    H/o cirrhosis: ? etiologyDiagnosed in 2016   -F actin was elevated-  -per GI-lactulose added  -on Rifaximin    Thrombocytopenia: chronic-attributed to cirrhosis-stable        Magda Farmer MD

## 2019-06-12 NOTE — DISCHARGE SUMMARY
[facility] -- Physician Discharge Summary     Jackie Manzano  1934  MRN: [id number]    Admit Date: 6/9/2019  Discharge Date: No discharge date for patient encounter. Attending MD: Anju Gutierrez MD  Discharging MD: Anju Gutierrez MD  PCP: Angelita Halsted,  [facility] [id number] [phone]    Admission Diagnosis: RADHA (acute kidney injury) ([facility] [N17.9]  RADHA (acute kidney injury) ([facility] [N17.9]  Acute kidney insufficiency [N28.9]  DISCHARGE DIAGNOSIS: same    Full Hospital Problem List:  Active Hospital Problems    Diagnosis Date Noted    Acute kidney insufficiency [N28.9] 06/11/2019    ARF (acute renal failure) ([facility] [N17.9] 06/09/2019    High cholesterol [E78.00] 06/09/2019    HTN (hypertension) [I10] 06/09/2019    Hypothyroid [E03.9] 06/09/2019    Syncope [R55] 06/09/2019    RADHA (acute kidney injury) ([facility] [N17.9] 06/09/2019           Hospital Course:  80 y. o. female presents for evaluation after a witnessed fall that occurred just prior to arrival. Patient reports that she felt dizzy which caused her to fall backwards. She didn't hit her head on the edge of her bed and has since been complaining of headache, continued dizziness, back and bilateral hip pain, left greater than right. Hip pain rated 6/10. Throbbing in nature. Currently constant in timing. Daughter reports the patient has been complaining of hip pain for the past couple of weeks and has had difficulty getting and out of cars and ambulating. During my evaluation, patient also started reporting difficulty or changes in her vision. She does have history of dementia and aphasia, per daughter. Daughter reports that squad had told her initially that one pupil was larger than the other. She is anticoagulated on Coumadin and Plavix. She denies any numbness, tingling or weakness of her extremities. No saddle anesthesia, bladder retention or bowel incontinence. No chest pain or shortness of breath.  No with a remote infarct. There is patchy periventricular and subcortical white matter low attenuation that is nonspecific but most consistent with chronic small vessel ischemia. There are few bilateral basal ganglia calcifications. No evidence of acute hemorrhage, mass or extra-axial fluid collection. ORBITS: The visualized portion of the orbits demonstrate no acute abnormality. SINUSES: There is unchanged disease in the left sphenoid locule. Disease has increased from 05/31/2019 paranasal sinuses are otherwise clear. Mastoids are poorly aerated on a developmental basis. SOFT TISSUES/SKULL:  No acute abnormality of the visualized skull or soft tissues. Generalized atrophy and chronic ischemic changes as above including remote left frontal infarct. No acute intracranial abnormality. Sphenoid sinus disease has increased from 05/31/2019. Findings were discussed with MICHELLE MCNALLY at 6:16 pm on 6/10/2019. Ct Head Wo Contrast    Result Date: 6/9/2019  EXAMINATION: CT OF THE HEAD WITHOUT CONTRAST  6/9/2019 3:31 pm TECHNIQUE: CT of the head was performed without the administration of intravenous contrast. Dose modulation, iterative reconstruction, and/or weight based adjustment of the mA/kV was utilized to reduce the radiation dose to as low as reasonably achievable. COMPARISON: CT 05/31/2019 HISTORY: ORDERING SYSTEM PROVIDED HISTORY: fall, head injury TECHNOLOGIST PROVIDED HISTORY: Has a \"code stroke\" or \"stroke alert\" been called? ->No Ordering Physician Provided Reason for Exam: Fall (Pt. comes in by [facility] home after she fell. Daughter was with pt. when she fell. Pt. felt dizzy prior to falling, daughter states she went backwards and hit her head on her bed.  Daughter also concerned becuase left hip is bothering her and had not had it looked at.) Acuity: Acute Type of Exam: Initial Mechanism of Injury: fall FINDINGS: BRAIN/VENTRICLES: There is no acute intracranial hemorrhage, mass effect or midline shift. No abnormal extra-axial fluid collection. The gray-white differentiation is maintained without evidence of an acute infarct. There is no evidence of hydrocephalus. Encephalomalacia in the left frontal lobe is unchanged. Mild atrophic changes. Moderate periventricular white matter hypoattenuation, likely due to small vessel ischemic disease. Remote lacunar infarct in right basal ganglia. ORBITS: The visualized portion of the orbits demonstrate no acute abnormality. SINUSES: Mild mucosal thickening in sphenoid sinus. SOFT TISSUES/SKULL:  No acute abnormality of the visualized skull or soft tissues. No acute intracranial abnormality. Ct Head Wo Contrast    Result Date: 5/31/2019  EXAMINATION: CT OF THE HEAD WITHOUT CONTRAST  5/31/2019 3:27 pm TECHNIQUE: CT of the head was performed without the administration of intravenous contrast. Dose modulation, iterative reconstruction, and/or weight based adjustment of the mA/kV was utilized to reduce the radiation dose to as low as reasonably achievable. COMPARISON: October 15, 2017 HISTORY: ORDERING SYSTEM PROVIDED HISTORY: head injury TECHNOLOGIST PROVIDED HISTORY: Has a \"code stroke\" or \"stroke alert\" been called? ->No Ordering Physician Provided Reason for Exam: Fall (pt brought in by [address] [place] [address] EMS from [facility]. Pt c/o headache after a witness mechanical fall this am at appox 0830. Did hit forehead. Denies LOC. Pt was monitored at facility and given tyelonel for pain. Pt is on blood thinners . At appox 1400 [facility] called 911 due to pt reporting a headache. Pt hx of Dementia. Squad and family reports pt at baseline. A&O to self.) Acuity: Acute Type of Exam: Initial Mechanism of Injury: fall FINDINGS: BRAIN/VENTRICLES: There is no acute intracranial hemorrhage, mass effect or midline shift. No abnormal extra-axial fluid collection. The gray-white differentiation is maintained without evidence of an acute infarct.   There is no evidence of hydrocephalus. Encephalomalacia related to remote infarct of the left frontal lobe again noted. ORBITS: The visualized portion of the orbits demonstrate no acute abnormality. SINUSES: The visualized paranasal sinuses and mastoid air cells demonstrate no acute abnormality. SOFT TISSUES/SKULL:  No acute abnormality of the visualized skull or soft tissues. No acute intracranial abnormality. Ct Cervical Spine Wo Contrast    Result Date: 6/9/2019  EXAMINATION: CT OF THE CERVICAL SPINE WITHOUT CONTRAST 6/9/2019 3:31 pm TECHNIQUE: CT of the cervical spine was performed without the administration of intravenous contrast. Multiplanar reformatted images are provided for review. Dose modulation, iterative reconstruction, and/or weight based adjustment of the mA/kV was utilized to reduce the radiation dose to as low as reasonably achievable. COMPARISON: 05/31/2019 HISTORY: ORDERING SYSTEM PROVIDED HISTORY: fall, head injury TECHNOLOGIST PROVIDED HISTORY: If patient is on cardiac monitor and/or pulse ox, they may be taken off cardiac monitor and pulse ox, left on O2 if currently on. All monitors reattached when patient returns to room. Ordering Physician Provided Reason for Exam: Fall (Pt. comes in by [facility] after she fell. Daughter was with pt. when she fell. Pt. felt dizzy prior to falling, daughter states she went backwards and hit her head on her bed. Daughter also concerned becuase left hip is bothering her and had not had it looked at.) Acuity: Acute Type of Exam: Initial Mechanism of Injury: fall FINDINGS: BONES/ALIGNMENT: Multiple contiguous axial images were obtained of the cervical spine. Sagittal and coronal reformats were obtained. Multilevel disc space narrowing is seen with endplate osteophytosis, compatible with degenerative disc disease. Vacuum phenomenon at C4-C5 and C5-C6. No spondylolisthesis. Moderate spurring is seen at C1-C2, similar to prior.   No marked vertebral body height loss. No gross fracture is seen. DEGENERATIVE CHANGES: As above SOFT TISSUES: Bilateral carotid artery calcification. Mucosal thickening of sphenoid sinus, incompletely imaged. Multilevel degenerative disc disease, without spondylolisthesis or gross fracture. Ct Cervical Spine Wo Contrast    Result Date: 5/31/2019  EXAMINATION: CT OF THE CERVICAL SPINE WITHOUT CONTRAST 5/31/2019 3:27 pm TECHNIQUE: CT of the cervical spine was performed without the administration of intravenous contrast. Multiplanar reformatted images are provided for review. Dose modulation, iterative reconstruction, and/or weight based adjustment of the mA/kV was utilized to reduce the radiation dose to as low as reasonably achievable. COMPARISON: None. HISTORY: ORDERING SYSTEM PROVIDED HISTORY: fall, injury TECHNOLOGIST PROVIDED HISTORY: Ordering Physician Provided Reason for Exam: Fall (pt brought in by [facility]  [facility]S from [facility]. Pt c/o headache after a witness mechanical fall this am at appox 0830. Did hit forehead. Denies LOC. Pt was monitored at facility and given tyelonel for pain. Pt is on blood thinners . At appox 1400 NH called 911 due to pt reporting a headache. Pt hx of Dementia. Squad and family reports pt at baseline. A&O to self.) Acuity: Acute Type of Exam: Initial Mechanism of Injury: fall FINDINGS: BONES/ALIGNMENT: No evidence of fracture or subluxation DEGENERATIVE CHANGES: Degenerative disc disease C4-C5 through C6-C7. Facet osteoarthritis C2-C3 on the right, and C3-C4 and C7-T1 on the left. SOFT TISSUES: There is no prevertebral soft tissue swelling. No acute abnormality of the cervical spine. Ct Lumbar Spine Wo Contrast    Result Date: 6/9/2019  EXAMINATION: CT OF THE LUMBAR SPINE WITHOUT CONTRAST,  6/9/2019 TECHNIQUE: CT of the lumbar spine was performed without the administration of intravenous contrast. Multiplanar reformatted images are provided for review.  Dose modulation, iterative reconstruction, and/or weight based adjustment of the mA/kV was utilized to reduce the radiation dose to as low as reasonably achievable. COMPARISON: None HISTORY: ORDERING SYSTEM PROVIDED HISTORY: Fall, pain TECHNOLOGIST PROVIDED HISTORY: Reason for exam:->Fall, pain Ordering Physician Provided Reason for Exam: Fall (Pt. comes in by [facility] home after she fell. Daughter was with pt. when she fell. Pt. felt dizzy prior to falling, daughter states she went backwards and hit her head on her bed. Daughter also concerned because left hip is bothering her and had not had it looked at.) Acuity: Acute Type of Exam: Initial Mechanism of Injury: Fall FINDINGS: BONES/ALIGNMENT: No acute fracture of the lumbar spine. Anterolisthesis of L4 on L5 measures 6 mm. DEGENERATIVE CHANGES: Severe degenerative disc disease is identified at L2-3. Moderate degenerative disc disease is identified at L3-4 and L5-S1. Findings are associated with endplate sclerosis and spurring. Severe facet arthropathy is seen at L3-4, L4-5, and L5-S1. SOFT TISSUES/RETROPERITONEUM: No paraspinal mass is seen. Other: Severe atherosclerosis. 1. No acute osseous abnormality of the lumbar spine. 2. Multilevel degenerative changes as described. Us Renal Complete    Result Date: 6/10/2019  EXAMINATION: RIGHT UPPER QUADRANT ULTRASOUND 6/10/2019 2:18 pm COMPARISON: CT 08/13/2016 HISTORY: ORDERING SYSTEM PROVIDED HISTORY: cirrhosis, eval for ascites, eval for liver mass TECHNOLOGIST PROVIDED HISTORY: Reason for exam:->cirrhosis, eval for ascites, eval for liver mass Ordering Physician Provided Reason for Exam: cirrhosis/Eval for liver mass and ascites Acuity: Chronic Type of Exam: Initial Additional signs and symptoms: GB removed 1970s Relevant Medical/Surgical History: na FINDINGS: LIVER:  The liver demonstrates normal echogenicity without evidence of intrahepatic biliary ductal dilatation. BILIARY SYSTEM:  Prior cholecystectomy.  Common bile duct is within normal limits measuring 6 mm. KIDNEYS: Right kidney measures 8.3 cm. Left kidney measures 9.5 cm. No hydronephrosis. Focal lesion or calculus. BLADDER: Unremarkable appearance of the bladder. PANCREAS:  Visualized portions of the pancreas are unremarkable. OTHER: No evidence of right upper quadrant ascites. Unremarkable right upper quadrant ultrasound. If there is concern for liver mass consider liver protocol MRI. No hydronephrosis. Xr Chest Portable    Result Date: 6/9/2019  EXAMINATION: ONE XRAY VIEW OF THE CHEST 6/9/2019 3:24 pm COMPARISON: None HISTORY: ORDERING SYSTEM PROVIDED HISTORY: Chest Discomfort TECHNOLOGIST PROVIDED HISTORY: Reason for exam:->Chest Discomfort Ordering Physician Provided Reason for Exam: recent fall Acuity: Acute Relevant Medical/Surgical History: dementia FINDINGS: Frontal view of the chest demonstrates no lines or tubes. Cardiomediastinal silhouette is mildly prominent. The lungs are low in volume. Pulmonary edema is identified. No dense consolidation or pleural effusion. No pneumothorax. No acute osseous abnormality. 1. Mild pulmonary edema. Us Abdomen Limited    Result Date: 6/10/2019  EXAMINATION: RIGHT UPPER QUADRANT ULTRASOUND 6/10/2019 2:18 pm COMPARISON: CT 08/13/2016 HISTORY: ORDERING SYSTEM PROVIDED HISTORY: cirrhosis, eval for ascites, eval for liver mass TECHNOLOGIST PROVIDED HISTORY: Reason for exam:->cirrhosis, eval for ascites, eval for liver mass Ordering Physician Provided Reason for Exam: cirrhosis/Eval for liver mass and ascites Acuity: Chronic Type of Exam: Initial Additional signs and symptoms: GB removed 1970s Relevant Medical/Surgical History: na FINDINGS: LIVER:  The liver demonstrates normal echogenicity without evidence of intrahepatic biliary ductal dilatation. BILIARY SYSTEM:  Prior cholecystectomy. Common bile duct is within normal limits measuring 6 mm. KIDNEYS: Right kidney measures 8.3 cm.   Left kidney measures 9.5 cm. No hydronephrosis. Focal lesion or calculus. BLADDER: Unremarkable appearance of the bladder. PANCREAS:  Visualized portions of the pancreas are unremarkable. OTHER: No evidence of right upper quadrant ascites. Unremarkable right upper quadrant ultrasound. If there is concern for liver mass consider liver protocol MRI. No hydronephrosis. Xr Hip 3-4 Vw W Pelvis Bilateral    Result Date: 6/9/2019  EXAMINATION: ONE XRAY VIEW OF THE PELVIS AND TWO XRAY VIEWS OF EACH OF THE BILATERAL HIPS 6/9/2019 3:24 pm COMPARISON: None. HISTORY: ORDERING SYSTEM PROVIDED HISTORY: bilateral pain TECHNOLOGIST PROVIDED HISTORY: Reason for exam:->bilateral pain Ordering Physician Provided Reason for Exam: recent fall, L side pain Acuity: Acute Type of Exam: Initial FINDINGS: Two views were obtained of the hips. No gross fracture. No dislocation. Mild sclerosis and spurring of the acetabula, in keeping with degenerative change. Pelvic phleboliths. No gross fracture. Xr Abdomen (2 Views)    Result Date: 6/11/2019  EXAMINATION: TWO XRAY VIEWS OF THE ABDOMEN 6/11/2019 5:00 pm COMPARISON: Portable chest 06/09/2019. Abdominal radiograph 08/15/2016. HISTORY: ORDERING SYSTEM PROVIDED HISTORY: nausea and vomiting TECHNOLOGIST PROVIDED HISTORY: Reason for exam:->nausea and vomiting Ordering Physician Provided Reason for Exam: nausea and vomiting Acuity: Unknown Type of Exam: Unknown FINDINGS: Low lung volumes. Patchy bilateral airspace disease in the visualized lungs. Right costophrenic angle blunting consistent with small pleural effusion. Gas is visible in normal caliber colon. There is a lack of small bowel gas. No suspicious abdominal calcification is seen. Indeterminate bowel-gas pattern due to lack of small bowel gas. Patchy bilateral airspace disease and small right pleural effusion.          Discharge Exam:  /74   Pulse 67   Temp 98.1 °F (36.7 °C) (Oral)   Resp 20   Ht 5' 4\" (1.626 m) Wt 202 lb 3.2 oz (91.7 kg)   SpO2 93%   BMI 34.71 kg/m²   General appearance: alert, appears stated age and cooperative  Head: Normocephalic, without obvious abnormality, atraumatic  Lungs: clear to auscultation bilaterally  Heart: regular rate and rhythm, S1, S2 normal, no murmur, click, rub or gallop  Abdomen: soft, non-tender; bowel sounds normal; no masses,  no organomegaly  Extremities: extremities normal, atraumatic, no cyanosis or edema  Pulses: 2+ and symmetric    Disposition: SNF    Condition: stable    Discharge Medications:   Arely Torrez   Home Medication Instructions [id number]    Printed on:06/12/19 0465   Medication Information                      acetaminophen (TYLENOL) 325 MG tablet  Take 650 mg by mouth every 6 hours as needed for Pain             amLODIPine (NORVASC) 10 MG tablet  Take 1 tablet by mouth daily             atorvastatin (LIPITOR) 80 MG tablet  Take 80 mg by mouth daily             clopidogrel (PLAVIX) 75 MG tablet  Take 75 mg by mouth daily             famotidine (PEPCID) 20 MG tablet  Take 20 mg by mouth daily             ferrous gluconate (FERGON) 324 (38 FE) MG tablet  Take 324 mg by mouth daily (with breakfast)             gabapentin (NEURONTIN) 300 MG capsule  Take 300 mg by mouth 4 times daily             Glucosamine 500 MG CAPS  Take 500 mg by mouth daily             hydrocortisone 1 % cream  Apply topically 2 times daily as needed Apply topically 2 times daily.               lactulose (CHRONULAC) 10 GM/15ML solution  Take 30 mLs by mouth 2 times daily             levothyroxine (SYNTHROID) 125 MCG tablet  Take 1 tablet by mouth Daily             meclizine (ANTIVERT) 12.5 MG tablet  Take 12.5 mg by mouth 3 times daily as needed             menthol-cetylpyridinium (CEPACOL) 3 MG lozenge  Take 1 lozenge by mouth every 2 hours as needed for Sore Throat             nystatin (MYCOSTATIN) POWD powder  Apply topically every 6 hours as needed For redness or rash potassium chloride (KLOR-CON) 10 MEQ extended release tablet  Take 10 mEq by mouth daily             rifaximin (XIFAXAN) 550 MG tablet  Take 1 tablet by mouth 2 times daily             vitamin D 1000 units CAPS  Take 1 capsule by mouth daily             warfarin (COUMADIN) 6 MG tablet  Take 6 mg by mouth every other day                 Allergies:  No Known Allergies    Follow up Instructions: Follow-up with PCP: Michael Carroll MD in 2 wk .       Total time spent on day of discharge including face-to-face visit, examination, documentation, counseling, preparation of discharge plans and followup, and discharge medicine reconciliation and presciptions is 35 minutes    Signed:  Wilson Mike MD  6/12/2019

## 2019-06-12 NOTE — CARE COORDINATION
DISCHARGE SUMMARY     DATE OF DISCHARGE: 06/12/19    DISCHARGE DESTINATION: [facility]    Level of Care: Long Term    Report Number: [phone]    Fax Number:  [phone]    Precert Obtained: N/A    Hens Completed: N/A    PASARR: N/A    Notified: RN, Family and Facility/Agency    Prescriptions Faxed:N/A    TRANSPORTATION: [place] [id number] Name: [address] [place] [address] up Time: 1030am    Phone Number: [phone]    Electronically signed by BIJAN Owens on 6/12/2019 at 10:23 AM

## 2019-06-13 NOTE — PROGRESS NOTES
Speech/Language Pathology  Discharge Note    Name: Roger Sampson   : 1934     Speech Therapy/Dysphagia  Pt discharged to [facility] on 19. Bedside Swallow Eval completed 6/10/19 (see report). No goals met prior to discharge. Recommend: Continued Dysphagia treatment at [facility], with goals and treatment per [facility]. DIET LEVEL AT DISCHARGE:  Regular texture diet with thin liquids, meds in puree    DYSPHAGIA GOALS:  1.) Pt will tolerate recommended diet without s/s of aspiration (GOAL NOT MET)  2.) If clinical symptoms of penetration/aspiration continue to be noted,Pt will tolerate MBS to r/o aspiration and determine appropriate diet/liquid level.   (GOAL NOT MET)  3.) Pt will improve oral motor function via bolus control exercises 5/5  (GOAL NOT MET)    Quirino Phan M.A., [address] [place] [address]  Speech-Language Pathologist

## 2019-06-14 NOTE — PROGRESS NOTES
Physical Therapy  Physical Therapy Discharge Summary    Name: Lexy Garcia  : 1934    The pt was evaluated by PT on 6/10/19 and seen for 1 treatment session prior to DC to ECF on 19 per MD order. The pt's acute therapy goals were:  Short term goals  Time Frame for Short term goals: by discharge   Short term goal 1: pt. will perform bed mobility with mod I   Short term goal 2: pt. will perform transfers with CGA   Short term goal 3: pt. will ambulate 50ft with LRAD and CGA   Long term goals  Time Frame for Long term goals : STG=LTG    Patient met 0 goals during stay. Number of Refusals:0  Number of Holds: 0  During this hospitalization, the patient was educated on:  Patient Education: PT POC, discharge plan     DC pt from PT caseload at this time. Thank you!     [address] [place] [place], [place] [address]

## 2019-06-18 PROBLEM — N39.0 UTI (URINARY TRACT INFECTION): Status: ACTIVE | Noted: 2019-01-01

## 2019-06-18 NOTE — ED NOTES
Called [facility] and spoke to nurse Aviva, who sent patient in. Per nurse pt was seen just a few minutes before she fell as she had had visitor. Fall itself was not witnessed but was only on ground for a couple minutes.      Garland Kothari RN  06/18/19 6307

## 2019-06-19 NOTE — ED PROVIDER NOTES
I personally evaluated and examined the patient in conjunction with the APC and agree with the assessment, treatment plan and disposition of the patient has recorded by the APC. I reviewed pertinent nurse's notes, triage notes, vital signs, past medical history, family and social history, medications, and allergies. Complete review of systems was conducted by the mid-level provider and/or myself. Review of systems is negative except as documented in the history of present illness. Brief HPI: 70-year-old female presents the emergency department with chief complaint of frequent falls and increased confusion over the past 5 days. History of Hannae Mulling. On lactulose. Increased confusion according to her family member. Physical Exam: General: Patient is in no acute distress   Head: Normocephalic, atraumatic, pupils are equal and reactive to light. EOMI. Neck: Neck is supple. No JVD noted. Heart: RRR no murmurs, rubs, or gallops   Lungs: CTA BL   Abdomen: soft, non-tender, non-distended   Extremities: no lower extremity edema. Capillary refill is less than 2 seconds   Skin: no cyanosis or pallor; no rashes noted   Neuro: CN's 2-12 are grossly intact. No focal neurologic deficit appreciated. Patient is very confused but has no associated facial droop or slurred speech. Follows commands. No cranial nerve deficit. Moves all 4 extremities and sensation appears to be intact to light touch. She is alert but not oriented to person place or time. Very confused appearing. EKG: EKG interpretation by ED physician: L axis, normal sinus rhythm at a rate of 77 bpm. No ischemic ST changes. Cardiac work-up initiated along with ammonia level which is elevated. IV fluids and lactulose was given. She also has a urinary tract infection so Rocephin was given. CT abdomen pelvis shows no acute findings but family members notified of pancreatic cyst and lung nodule.   She will be admitted for further management. FINAL IMPRESSION     1. Fall from bed, initial encounter    2. Dementia without behavioral disturbance, unspecified dementia type    3. Acute cystitis without hematuria    4. Hyperammonemia (HCC)    5. Lung nodule    6. Pancreatic cyst            Electronically signed by:   Maribel Rust, DO  06/18/19 3671

## 2019-06-19 NOTE — CONSULTS
Gastroenterology Consult Note      Patient: Leanne Wan  : 1934  Acct#:      Date:  2019    Subjective:       History of Present Illness  Patient is a 80 y.o.  female admitted with UTI (urinary tract infection) [N39.0]  UTI (urinary tract infection) [N39.0] who is seen in consult for hepatic encephalopathy. H/o CVA, expressive aphasia. 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 H/o small ascites in 2016 but not enough for paracentesis. Not on diuretics. No prior screening EGD d/t age. H/o HE on xifaxan BID and on lactulose. She has had falls recently and was admitted about a week ago for this. We saw pt then for elevated ammonia. She had only been having 1-2 BMs daily on lactulose then. Also had RADHA which could have precipitated the HE. No ascites or infection then. Now admitted after 2 more falls. Dx with UTI and HE. Per her daughter, pt was confused yesterday but at baseline today. Had c/o abdominal pain yesterday but none today. No BMs here. No melena or hematochezia. Past Medical History:   Diagnosis Date    Anemia     Hyperlipidemia     Hypertension     Nonalcoholic fatty liver disease     Thyroid disease     Unspecified cerebral artery occlusion with cerebral infarction       Past Surgical History:   Procedure Laterality Date    CHOLECYSTECTOMY      HYSTERECTOMY        Past Endoscopic History    Admission Meds  No current facility-administered medications on file prior to encounter.       Current Outpatient Medications on File Prior to Encounter   Medication Sig Dispense Refill    warfarin (COUMADIN) 6 MG tablet Take 6.5 mg by mouth every other day      amLODIPine (NORVASC) 10 MG tablet Take 1 tablet by mouth daily 30 tablet 3    levothyroxine (SYNTHROID) 125 MCG tablet Take 1 tablet by mouth Daily 30 tablet 3    Glucosamine 500 MG CAPS Take 500 mg by mouth daily      famotidine (PEPCID) 20 MG tablet Take 20 mg by mouth and rhythm, normal S1 and S2, no murmurs or rubs  Abdomen: soft, non-tender. Bowel sounds normal. No masses,  no organomegaly. Extremities: atraumatic, no cyanosis, ble edema  Skin: warm and dry, no jaundice  Neuro: Grossly intact,  world salad with h/o expressive aphasia  Musculoskeletal: 5/5  strength BUE      Data Review:    Recent Labs     06/18/19 2017 06/19/19  0622   WBC 5.8 4.1   HGB 13.3 11.8*   HCT 39.4 34.8*   .4* 100.8*   * 107*     Recent Labs     06/18/19 2018 06/19/19  0622    143   K 4.1 4.1    110   CO2 19* 23   BUN 19 17   CREATININE 1.2 1.2     Recent Labs     06/18/19 2018 06/19/19  0622   AST 39* 30   ALT 25 22   BILITOT 1.0 0.8   ALKPHOS 133* 110     No results for input(s): LIPASE, AMYLASE in the last 72 hours. Recent Labs     06/18/19 2017 06/19/19 0622   PROTIME 19.6* 20.3*   INR 1.72* 1.78*     No results for input(s): PTT in the last 72 hours. No results for input(s): OCCULTBLD in the last 72 hours. Imaging Studies:                 CT-scan of chest, abdomen and pelvis w IV contrast: 6/18/19  Impression   1. No acute traumatic injury. 2. 1.8 cm enlarging solid right lower lobe pulmonary nodule.  Recommend   PET-CT for further evaluation. 3. Cirrhosis with stigmata of portal venous hypertension. 4. Small right and trace left pleural effusions. 5. 9 mm pancreatic cystic lesion.  Recommend MRI of the abdomen with without   contrast using pancreatic mass protocol and MRCP in 2 years. 6. Small pelvic ascites.                          Assessment:     Principal Problem:    UTI (urinary tract infection)  Active Problems:    High cholesterol    HTN (hypertension)    Hypothyroid  Resolved Problems:    * No resolved hospital problems. *    Elevated ammonia, hepatic encephalopathy - NH3 elevated at 79. She has expressive aphasia s/p CVA but per daughter pt was confused yesterday and at baseline today. reports compliance with xifaxan.  Takes lactulose BID and only has 1-2 BMs daily rather than 3 as previously recommended. This or the UTI could have precipitated the HE. Cirrhosis - d/t KRAUS. Followed by Dr Marj Carey. Minimal pelvic ascites on CT. No GI bleeding. H/o hepatic encephalopathy. f-actin was weakly positive in the past.   H/o falls - may be due to UTI this time. ?contribution from HE. UTI    Recommendations:   - lactulose q2 hours until having multiple BMs then decrease frequency for goal of 3 stools daily. D/w her daughter regarding goal of at least 3 stools daily to keep ammonia down. - xifaxan 550 mg BID  - low sodium diet  - PT/OT eval    Discussed with Dr. Beatriz Ludwig, PA-C  [address]    I have personally performed a face to face diagnostic evaluation on this patient. I have interviewed and examined the patient and I agree with the findings and recommended plan of care. In summary, my findings and plan are the following: falls and hepatic enceph, now mentation improved, abd soft, no signif asterixis, suspect uti triggered hepatic enceph, rec tx uti, increase lactulose for 3-4 soft/mushy bm/day, cont xifaxan, PT/OT ensure more stable on feet, and f/u lung lesion with pcp, can f/u small pancreas area in a few years per radiology rec.       Inocente Rea MD  [address]

## 2019-06-19 NOTE — CARE COORDINATION
Pt d/c'd 06/12/19 to [facility]. Pt is LTC at the facility. Left message for admissions to confirm bed hold. Sw will follow pt.   Electronically signed by BIJAN Solomon on 6/19/2019 at 8:10 AM

## 2019-06-19 NOTE — FLOWSHEET NOTE
Color Yellow/straw   Incontinence Yes   Anus/Rectum   Anus/Rectum (WDL) WDL   Psychosocial   Psychosocial (WDL) WDL

## 2019-06-19 NOTE — PROGRESS NOTES
Call placed to [facility]; healthcare personnel \"Summer\" confirmed that pt had not had any diarrhea in the past week and confirmed med rec - see flowsheets.

## 2019-06-19 NOTE — PROGRESS NOTES
Pt. Oriented to room and call light. Pt. Welcome packet given. Pt. Admission completed - see flowsheets. Pt. Denies other needs at this time. Call light/table in reach. Fall precautions in place. Will continue to monitor.

## 2019-06-19 NOTE — ED PROVIDER NOTES
[facility]        Pt Name: Carmelina Barroso  MRN: [id number]  [place] 1934  Date of evaluation: 6/18/2019  Provider: WIL Galarza  PCP: Ronel Fuentes MD    This patient was seen and evaluated by the attending physician Dhaval Mejia       Chief Complaint   Patient presents with    Fall     Pt in from [facility] found on floor wedged under bed, unsure of when see last per squad [facility]CF didn't know. Pt alert and orientated. Pt with dementia inable to tell RN when she feel. Pt complaining of abdominal pain. Pt on coumedin and plavix. HISTORY OF PRESENT ILLNESS   (Location/Symptom, Timing/Onset, Context/Setting, Quality, Duration, Modifying Factors, Severity)  Note limiting factors. Carmelina Barroso is a 80 y.o. female with past medical history of anemia, hyperlipidemia, hypertension, thyroid disease and previous CVA who presents the ED with complaint of a fall. Apparently was found on the ground wedged between the bed. EMS states they were unsure how long patient was on the ground but upon discussion with family at bedside family was in the room just left him when they returned a few minutes later she was on the ground. Fall was not witnessed. Patient is demented and very difficult historian at this time. Is on Coumadin and Plavix. Patient was complaining of abdominal pain to nurse but upon my exam denies any abdominal pain. Patient apparently told family that she was complaining of a headache but denies headache on my exam.  Patient states she does have pain to her neck though and also to her right forearm and left hip. However when you asked patient this multiple times sometimes she says yes sometimes she says no. Very difficult historian this time and demented. Family states has been having multiple falls over the past couple months. Unclear why she is been having these falls.   Patient is a resident of extended care facility. Nursing Notes were all reviewed and agreed with or any disagreements were addressed  in the HPI. REVIEW OF SYSTEMS    (2-9 systems for level 4, 10 or more for level 5)     Review of Systems   Unable to perform ROS: Dementia       Positives and Pertinent negatives as per HPI. Except as noted abovein the ROS, all other systems were reviewed and negative. PAST MEDICAL HISTORY     Past Medical History:   Diagnosis Date    Anemia     Hyperlipidemia     Hypertension     Thyroid disease     Unspecified cerebral artery occlusion with cerebral infarction          SURGICAL HISTORY     Past Surgical History:   Procedure Laterality Date    CHOLECYSTECTOMY      HYSTERECTOMY           CURRENTMEDICATIONS       Previous Medications    ACETAMINOPHEN (TYLENOL) 325 MG TABLET    Take 650 mg by mouth every 6 hours as needed for Pain    AMLODIPINE (NORVASC) 10 MG TABLET    Take 1 tablet by mouth daily    ATORVASTATIN (LIPITOR) 80 MG TABLET    Take 80 mg by mouth daily    CLOPIDOGREL (PLAVIX) 75 MG TABLET    Take 75 mg by mouth daily    FAMOTIDINE (PEPCID) 20 MG TABLET    Take 20 mg by mouth daily    FERROUS GLUCONATE (FERGON) 324 (38 FE) MG TABLET    Take 324 mg by mouth daily (with breakfast)    GABAPENTIN (NEURONTIN) 300 MG CAPSULE    Take 300 mg by mouth 4 times daily    GLUCOSAMINE 500 MG CAPS    Take 500 mg by mouth daily    HYDROCORTISONE 1 % CREAM    Apply topically 2 times daily as needed Apply topically 2 times daily.      LACTULOSE (CHRONULAC) 10 GM/15ML SOLUTION    Take 30 mLs by mouth 2 times daily    LEVOTHYROXINE (SYNTHROID) 125 MCG TABLET    Take 1 tablet by mouth Daily    MECLIZINE (ANTIVERT) 12.5 MG TABLET    Take 12.5 mg by mouth 3 times daily as needed    MENTHOL-CETYLPYRIDINIUM (CEPACOL) 3 MG LOZENGE    Take 1 lozenge by mouth every 2 hours as needed for Sore Throat    NYSTATIN (MYCOSTATIN) POWD POWDER    Apply topically every 6 hours as needed For redness or rash 06/18/19 1923   (!) 148/73 98.9 °F (37.2 °C) Infrared 84 18 95 % 5' 4\" (1.626 m) 202 lb (91.6 kg)       Physical Exam   Constitutional: She appears well-developed and well-nourished. No distress. HENT:   Head: Normocephalic and atraumatic. Right Ear: External ear normal.   Left Ear: External ear normal.   Mouth/Throat: Oropharynx is clear and moist. No oropharyngeal exudate. Head appears atraumatic. No raccoon eyes or malone sign. No crepitus or step-off. No epistaxis. No septal hematoma. No trismus or jaw malocclusion. No evidence of [place] fracture. Eyes: Pupils are equal, round, and reactive to light. Conjunctivae and EOM are normal. Right eye exhibits no discharge. Left eye exhibits no discharge. Neck: Normal range of motion. Neck supple. Cardiovascular: Normal rate, regular rhythm, normal heart sounds and intact distal pulses. Exam reveals no gallop and no friction rub. No murmur heard. Pulmonary/Chest: Effort normal and breath sounds normal. No stridor. No respiratory distress. She has no wheezes. She has no rales. She exhibits no tenderness. Abdominal: Soft. Bowel sounds are normal. She exhibits no distension and no mass. There is no tenderness. There is no rigidity, no rebound, no guarding and no CVA tenderness. Musculoskeletal: Normal range of motion. Arms:  No obvious discomfort with palpation of the cervical, thoracic or lumbar spine. No anterior chest wall discomfort. No clavicular discomfort. No rib cage discomfort with palpation. No TTP to the left upper extremity throughout. Tenderness over the right forearm the patient has what appears to be some old healing bruises which family states is from previous fall. Patient does have tenderness over this area. Distal neurovascular intact. No other TTP to the upper extremity. No pelvis instability. Patient does have some slight discomfort with movement of the left hip. No shortening or rotation.   Distal neurovascular intact. No obvious discomfort with palpation of the lower extremities throughout. Gait deferred. Neurological: She is alert. She has normal strength. No cranial nerve deficit or sensory deficit. GCS eye subscore is 4. GCS verbal subscore is 5. GCS motor subscore is 6. Gait deferred. Skin: Skin is warm and dry. No rash noted. She is not diaphoretic. No erythema. No pallor. Psychiatric: She has a normal mood and affect.  Her behavior is normal.       DIAGNOSTIC RESULTS   LABS:    Labs Reviewed   CBC WITH AUTO DIFFERENTIAL - Abnormal; Notable for the following components:       Result Value    RBC 3.92 (*)     .4 (*)     Platelets 026 (*)     Lymphocytes # 0.8 (*)     All other components within normal limits    Narrative:     Performed at:  [facility] [address], [place], [address]   Phone [phone]   COMPREHENSIVE METABOLIC PANEL W/ REFLEX TO MG FOR LOW K - Abnormal; Notable for the following components:    CO2 19 (*)     Glucose 139 (*)     GFR Non- 43 (*)     GFR African American 52 (*)     Alkaline Phosphatase 133 (*)     AST 39 (*)     All other components within normal limits    Narrative:     Performed at:  [facility] [address], [place], [address]   Phone [phone]   URINE RT REFLEX TO CULTURE - Abnormal; Notable for the following components:    Clarity, UA TURBID (*)     Ketones, Urine TRACE (*)     Blood, Urine LARGE (*)     Protein,  (*)     Nitrite, Urine POSITIVE (*)     Leukocyte Esterase, Urine LARGE (*)     All other components within normal limits    Narrative:     Performed at:  [facility] [address], [place], [address]   Phone [phone]   PROTIME-INR - Abnormal; Notable for the following components:    Protime 19.6 (*)     INR 1.72 (*)     All other components within normal limits    Narrative:     Performed at:  [facility] [address] [facility]  [place], [address]   Phone [phone]   AMMONIA - Abnormal; Notable for the following components:    Ammonia 79 (*)     All other components within normal limits    Narrative:     Performed at:  [facility] [address] [facility] [place],  [place], [address]   Phone [phone]   MICROSCOPIC URINALYSIS - Abnormal; Notable for the following components:    RBC, UA 5-10 (*)     Bacteria, UA 4+ (*)     WBC, UA >900 (*)     All other components within normal limits    Narrative:     Performed at:  [facility] [address], [place], [address]   Phone [phone]   URINE CULTURE   CK    Narrative:     Performed at:  [facility] [address]  [place], [address]   Phone [phone]   TROPONIN    Narrative:     Performed at:  [facility] [address]  [place], [address]   Phone [phone]     Narrative:     Performed at:  [facility] [address]  [place], [facility] [address]   Phone [phone]       All other labs were within normal range or not returned as of this dictation. EKG: All EKG's are interpreted by the Emergency Department Physician who either signs orCo-signs this chart in the absence of a cardiologist.  Please see their note for interpretation of EKG. RADIOLOGY:   Non-plain film images such as CT, Ultrasound and MRI are read by the radiologist. Emily Grimm radiographic images are visualized andpreliminarily interpreted by the  ED Provider with the below findings:        Interpretation perthe Radiologist below, if available at the time of this note:    CT Cervical Spine WO Contrast   Final Result   No acute abnormality of the cervical spine. Ground-glass opacity in septal thickening within lung apices.   Correlation   for mild edema is recommended. CT Head WO Contrast   Final Result   No acute intracranial abnormality. XR FEMUR LEFT (MIN 2 VIEWS)   Final Result   1. No acute abnormality. XR RADIUS ULNA RIGHT (2 VIEWS)   Final Result   No acute bony abnormality. CT CHEST ABDOMEN PELVIS W CONTRAST    (Results Pending)     No results found. PROCEDURES   Unless otherwise noted below, none     Procedures    CRITICAL CARE TIME   N/A    CONSULTS:  None      EMERGENCY DEPARTMENT COURSE and DIFFERENTIALDIAGNOSIS/MDM:   Vitals:    Vitals:    06/18/19 1923 06/18/19 1930 06/18/19 2000   BP: (!) 148/73 (!) 148/55 115/81   Pulse: 84 86 81   Resp: 18 12 12   Temp: 98.9 °F (37.2 °C)     TempSrc: Infrared     SpO2:  95% 95%   Weight: 202 lb (91.6 kg)     Height: 5' 4\" (1.626 m)         Patient was given thefollowing medications:  Medications   lactulose (CHRONULAC) 10 GM/15ML solution 20 g (has no administration in time range)   iopamidol (ISOVUE-370) 76 % injection 75 mL (75 mLs Intravenous Given 6/18/19 2117)   cefTRIAXone (ROCEPHIN) 1 g in sterile water 10 mL IV syringe (1 g Intravenous Given 6/18/19 2203)       Patient is an 59-year-old female who presents to the input of a fall. Had questionable fall where she was found on the ground wedged between the bed and the corner. Patient apparently was complaining of abdominal pain to nursing staff but denies this upon my exam.  Apparently was complaining of headache to family but denies this upon my exam.  Patient does complain of right forearm pain and left hip pain. Patient very difficult historian at this time given demented status. Apparently upon review of records patient able to give appropriate history and at this time appears to be all over the place. Patient concern for altered mental status change from her baseline dementia at this time. IV established and blood work obtained given frequent falls and what appears to be acute change in mental status.   CMP words are mis-transcribed.)    WIL Scott (electronically signed)          WIL Hahn  06/18/19 8986

## 2019-06-19 NOTE — PROGRESS NOTES
Clinical Pharmacy Note:    Pharmacy consulted to dose warfarin. Target INR: 2.0-3.0  INR on admission: 1.72  Outpatient dosing: alternative 6 mg and 6.5 mg every other day    As patient is subtherapeutic, will given an additional 6.5 mg dose now. Daily INRs ordered. Pharmacy will continue to follow and adjust dosing as necessary. Thanks for the consult!   Jose Mcdaniels, [place], [facility]

## 2019-06-19 NOTE — DISCHARGE INSTR - COC
Continuity of Care Form    Patient Name: David Aguilera   :  1934  MRN:  [id number]    Admit date:  2019  Discharge date:  19    Code Status Order: [facility]   Advance Directives:   [facility] Documentation     Date/Time Healthcare Directive Type of Healthcare Directive Copy in [facility] [address] Agent's Name Healthcare Agent's Phone Number    19 0048  Yes, patient has an advance directive for healthcare treatment  Health care treatment directive  No, copy requested from medical records [address] of   [place]  [phone]          Admitting Physician:  Eleuterio Mayes MD  PCP: Andrew Louise MD    Discharging Nurse: [facility] Unit/Room#: 4HF-7605/2512-32  Discharging Unit Phone Number: ***    Emergency Contact:   Extended Emergency Contact Information  Primary Emergency Contact: [facility]  Address: [address] Phone: [phone]  Mobile Phone: [phone]  Relation: Child    Past Surgical History:  Past Surgical History:   Procedure Laterality Date    CHOLECYSTECTOMY      HYSTERECTOMY         Immunization History:   Immunization History   Administered Date(s) Administered    Tdap (Boostrix, Adacel) 2015       Active Problems:  Patient Active Problem List   Diagnosis Code    Lactic acidosis E87.2    Gastroenteritis, acute K52.9    ARF (acute renal failure) ([facility] N17.9    High cholesterol E78.00    HTN (hypertension) I10    Hypothyroid E03.9    Syncope R55    [facility] N17.9    Acute kidney insufficiency N28.9    UTI (urinary tract infection) N39.0       Isolation/Infection:   Isolation          No Isolation            Nurse Assessment:  Last Vital Signs: /66   Pulse 71   Temp 98.4 °F (36.9 °C)   Resp 18   Ht 5' 4\" (1.626 m)   Wt 206 lb 14.4 oz (93.8 kg)   SpO2 95%   BMI 35.51 kg/m² Last documented pain score (0-10 scale): Pain Level: 5  Last Weight:   Wt Readings from Last 1 Encounters:   19 206 lb 14.4 oz (93.8 kg)     Mental Status:  {IP PT MENTAL STATUS:}    IV Access:  { DENNY IV ACCESS:815573077}    Nursing Mobility/ADLs:  Walking   {[facility] DME OHOD:159662946}  Transfer  {P DME JHHE:991429942}  Bathing  {CHP DME QEQ}  Dressing  {CHP DME TTLT:924713032}  Toileting  {CHP DME EIXA:628872598}  Feeding  {P DME JTQP:226549877}  Med Admin  {P DME SZRA:545407623}  Med Delivery   { DENNY MED Delivery:851822948}    Wound Care Documentation and Therapy:        Elimination:  Continence:   · Bowel: {YES / VM:50881}  · Bladder: {YES / ZK:58678}  Urinary Catheter: {Urinary Catheter:066070723}   Colostomy/Ileostomy/Ileal Conduit: {YES / F}       Date of Last BM: ***  No intake or output data in the 24 hours ending 19 0807  No intake/output data recorded.     Safety Concerns:     [address] Safety Concerns:068520949}    Impairments/Disabilities:      [address] Impairments/Disabilities:085947133}    Nutrition Therapy:  Current Nutrition Therapy:   [address] Diet List:045527044}    Routes of Feeding: {[facility] DME Other Feedings:867877671}  Liquids: {Slp liquid thickness:51518}  Daily Fluid Restriction: {CHP DME Yes amt example:948324482}  Last Modified Barium Swallow with Video (Video Swallowing Test): {Done Not Done WLAF:511552290}    Treatments at the Time of Hospital Discharge:   Respiratory Treatments: ***  Oxygen Therapy:  {Therapy; copd oxygen:68434}  Ventilator:    {[facility] Vent SOQM:186954669}    Rehab Therapies: {THERAPEUTIC INTERVENTION:1230523822}  Weight Bearing Status/Restrictions: [address]  Weight Bearin}  Other Medical Equipment (for information only, NOT a DME order):  {EQUIPMENT:427286443}  Other Treatments: ***    Patient's personal belongings (please select all that are sent with patient):  {JULIA DME Belongings:386134180}    RN SIGNATURE: {Esignature:699511982}    CASE MANAGEMENT/SOCIAL WORK SECTION    Inpatient Status Date: 06/18/19    Readmission Risk Assessment Score:  Readmission Risk              Risk of Unplanned Readmission:        21         Discharging to Facility/ Agency   · Name: [facility]   · Address:  · Phone: [phone]  · Fax: [phone]    / signature: Electronically signed by BIJAN Pompa on 6/19/2019 at 8:07 AM    PHYSICIAN SECTION    Prognosis: Fair    Condition at Discharge: Stable    Rehab Potential (if transferring to Rehab): Good    Recommended Labs or Other Treatments After Discharge:      Physician Certification: I certify the above information and transfer of Dania Sharif  is necessary for the continuing treatment of the diagnosis listed and that she requires [place] for greater 30 days.      Update Admission H&P: No change in H&P    PHYSICIAN SIGNATURE:  Electronically signed by Terrence Mitchell MD on 6/21/19 at 10:28 AM

## 2019-06-19 NOTE — ED NOTES
Pt in from [facility] via EMS. Per EMS when they arrived pt was found on the floor kind of wedged under the bed. Per squad they do not know how long she was on the floor for. After speaking to family and [facility], they state a visitor heard the patient fall and staff went in right away so pt was only on ground for 10-15 minutes prior to arrival. EMS states that patients only complaint was a headache. Pt is on coumadin and Plavix. Pt denies any head neck or back pain at this time. Pt is complaining of pain in abdomin. Pt is alert but only orientated to self. Pt with hx of dementia. Per family patient has seemed more altered than normal. Pt placed on monitor at this time and Magan Uriostegui in to assess pt.       [address], RN  06/18/19 8734

## 2019-06-19 NOTE — PROGRESS NOTES
4 Eyes Skin Assessment     The patient is being assess for  Admission    I agree that 2 RN's have performed a thorough Head to Toe Skin Assessment on the patient. ALL assessment sites listed below have been assessed. Areas assessed by both nurses: Head to Toe   [x]   Head, Face, and Ears   [x]   Shoulders, Back, and Chest  [x]   Arms, Elbows, and Hands   [x]   Coccyx, Sacrum, and IschIum  [x]   Legs, Feet, and Heels        Does the Patient have Skin Breakdown?   No         Jeremy Prevention initiated:  Yes   Wound Care Orders initiated:  NA      [facility] nurse consulted for Pressure Injury (Stage 3,4, Unstageable, DTI, NWPT, and Complex wounds), New and Established Ostomies:  NA      Nurse 1 eSignature: Electronically signed by Neftali Huffman RN on 6/19/19 at 3:19 AM    **SHARE this note so that the co-signing nurse is able to place an eSignature**    Nurse 2 eSignature: Electronically signed by Bony Villa RN on 6/19/19 at 4:59 AM

## 2019-06-19 NOTE — H&P
infection) [N39.0]    HTN (hypertension) [I10]    High cholesterol [E78.00]    Hypothyroid [E03.9]         Review of Systems: (1 system for EPF, 2-9 for detailed, 10+ for comprehensive)  Review of Systems   Unable to perform ROS: Dementia           Past Medical History:   Past Medical History:   Diagnosis Date    Anemia     Hyperlipidemia     Hypertension     Nonalcoholic fatty liver disease 2016    Thyroid disease     Unspecified cerebral artery occlusion with cerebral infarction        Past Surgical History:   Past Surgical History:   Procedure Laterality Date    CHOLECYSTECTOMY      HYSTERECTOMY         Social History:   Social History     Tobacco History     Smoking Status  Never Smoker    Smokeless Tobacco Use  Never Used          Alcohol History     Alcohol Use Status  No          Drug Use     Drug Use Status  No          Sexual Activity     Sexually Active  Not Asked                Fam History: History reviewed. No pertinent family history. PFSH: The above PMHx, PSHx, SocHx, FamHx has been reviewed by myself. (1 area for detailed, 2-3 for comprehensive)      Code Status: DNR-CC    Meds - following list of home medications fromelectronic chart has been reviewed by myself  Prior to Admission medications    Medication Sig Start Date End Date Taking?  Authorizing Provider   warfarin (COUMADIN) 6 MG tablet Take 6.5 mg by mouth every other day   Yes Historical Provider, MD   amLODIPine (NORVASC) 10 MG tablet Take 1 tablet by mouth daily 6/12/19  Yes Mandy Jon MD   levothyroxine (SYNTHROID) 125 MCG tablet Take 1 tablet by mouth Daily 6/13/19  Yes Mandy Jon MD   Glucosamine 500 MG CAPS Take 500 mg by mouth daily   Yes Historical Provider, MD   famotidine (PEPCID) 20 MG tablet Take 20 mg by mouth daily   Yes Historical Provider, MD   potassium chloride (KLOR-CON) 10 MEQ extended release tablet Take 10 mEq by mouth daily   Yes Historical Provider, MD   acetaminophen (TYLENOL) 325 MG tablet Take 650 mg by mouth every 6 hours as needed for Pain   Yes Historical Provider, MD   vitamin D 1000 units CAPS Take 1 capsule by mouth daily   Yes Historical Provider, MD   rifaximin (XIFAXAN) 550 MG tablet Take 1 tablet by mouth 2 times daily 8/16/16  Yes Faisal Cruz MD   lactulose (CHRONULAC) 10 GM/15ML solution Take 30 mLs by mouth 2 times daily 8/16/16  Yes Faisal Cruz MD   atorvastatin (LIPITOR) 80 MG tablet Take 80 mg by mouth daily   Yes Historical Provider, MD   clopidogrel (PLAVIX) 75 MG tablet Take 75 mg by mouth daily   Yes Historical Provider, MD   gabapentin (NEURONTIN) 300 MG capsule Take 300 mg by mouth 4 times daily   Yes Historical Provider, MD   ferrous gluconate (FERGON) 324 (38 FE) MG tablet Take 324 mg by mouth daily (with breakfast)   Yes Historical Provider, MD   menthol-cetylpyridinium (CEPACOL) 3 MG lozenge Take 1 lozenge by mouth every 2 hours as needed for Sore Throat    Historical Provider, MD   warfarin (COUMADIN) 6 MG tablet Take 6 mg by mouth every other day    Historical Provider, MD   hydrocortisone 1 % cream Apply topically 2 times daily as needed Apply topically 2 times daily.      Historical Provider, MD   nystatin (MYCOSTATIN) POWD powder Apply topically every 6 hours as needed For redness or rash    Historical Provider, MD   meclizine (ANTIVERT) 12.5 MG tablet Take 12.5 mg by mouth 3 times daily as needed    Historical Provider, MD         No Known Allergies          EXAM: (2-7 system for EPF/Detailed, ?8 for Comprehensive)  /66   Pulse 71   Temp 98.4 °F (36.9 °C)   Resp 18   Ht 5' 4\" (1.626 m)   Wt 206 lb 14.4 oz (93.8 kg)   SpO2 95%   BMI 35.51 kg/m²   Constitutional: vitals as above: alert and appears stated age  Head: Normocephalic, without obvious abnormality, atraumatic  Eyes:lids and lashes normal, conjunctivae and sclerae normal and pupils equal, round, reactive to light and accomodation  EMNT: external ears normal, lips normal  Neck: no carotid bruit, [address]   Phone [phone]   AMMONIA - Abnormal; Notable for the following components:    Ammonia 79 (*)     All other components within normal limits    Narrative:     Performed at:  [facility] [address] [facility] [place]   Phone [phone]   MICROSCOPIC URINALYSIS - Abnormal; Notable for the following components:    RBC, UA 5-10 (*)     Bacteria, UA 4+ (*)     WBC, UA >900 (*)     All other components within normal limits    Narrative:     Performed at:  [facility] [address] [facility] [place]   Phone [phone]   COMPREHENSIVE METABOLIC PANEL W/ REFLEX TO MG FOR LOW K - Abnormal; Notable for the following components:    GFR Non- 43 (*)     GFR  52 (*)     Total Protein 5.5 (*)     Alb 2.9 (*)     All other components within normal limits    Narrative:     Performed at:  [facility] [address]   Phone [phone]   CBC WITH AUTO DIFFERENTIAL - Abnormal; Notable for the following components:    RBC 3.45 (*)     Hemoglobin 11.8 (*)     Hematocrit 34.8 (*)     .8 (*)     MCH 34.2 (*)     Platelets 631 (*)     Lymphocytes # 0.9 (*)     All other components within normal limits    Narrative:     Performed at:  [facility] [address] [place]   Phone [phone]   PROTIME-INR - Abnormal; Notable for the following components:    Protime 20.3 (*)     INR 1.78 (*)     All other components within normal limits    Narrative:     Performed at:  [facility] [address]   Phone [phone]   URINE CULTURE   CULTURE BLOOD #1   URINE CULTURE   CK    Narrative:     Performed at:  [facility] [address]   Phone [phone]   TROPONIN    Narrative:     Performed at:  [facility]  [address] [facility], [address] [place] [address]   Phone [phone]    Narrative:     Performed at:  [facility]  [address] [facility], [address]   Phone [phone]   URINALYSIS         IMAGING:  Imaging results from the ER have been reviewed in the computerized chart. Xr Radius Ulna Right (2 Views)    Result Date: 6/18/2019  EXAMINATION: 2 XRAY VIEWS OF THE RIGHT FOREARM 6/18/2019 8:49 pm COMPARISON: None. HISTORY: ORDERING SYSTEM PROVIDED HISTORY: inj TECHNOLOGIST PROVIDED HISTORY: Reason for exam:->inj Ordering Physician Provided Reason for Exam: Fall (Pt in from [facility] found on floor wedged under bed, unsure of when see last per squad ECF didn't know. Pt alert and orientated. Pt with dementia inable to tell RN when she feel. Pt complaining of abdominal pain. Pt on coumedin and plavix.) Acuity: Unknown Type of Exam: Unknown FINDINGS: The bones appear mildly demineralized and show degenerative changes. No acute fracture or dislocation is identified. No acute bony abnormality. Xr Femur Left (min 2 Views)    Result Date: 6/18/2019  EXAMINATION: 5 XRAY VIEWS OF THE LEFT FEMUR 6/18/2019 8:49 pm COMPARISON: None. HISTORY: ORDERING SYSTEM PROVIDED HISTORY: inj TECHNOLOGIST PROVIDED HISTORY: Reason for exam:->inj Ordering Physician Provided Reason for Exam: Fall (Pt in from [facility] found on floor wedged under bed, unsure of when see last per squad ECF didn't know. Pt alert and orientated. Pt with dementia inable to tell RN when she feel. Pt complaining of abdominal pain. Pt on coumedin and plavix.) Acuity: Unknown Type of Exam: Unknown FINDINGS: There is no acute fracture. The bones are demineralized. There are no bony destructive lesions. Degenerative changes involve the knee and hip. 1. No acute abnormality.      Ct Head Wo Contrast    Result Date: 6/18/2019  EXAMINATION: CT OF THE HEAD WITHOUT CONTRAST  6/18/2019 9:14 pm TECHNIQUE: CT of the head was performed without the administration of intravenous contrast. Dose modulation, iterative reconstruction, and/or weight based adjustment of the mA/kV was utilized to reduce the radiation dose to as low as reasonably achievable. COMPARISON: 06/10/2018 HISTORY: ORDERING SYSTEM PROVIDED HISTORY: fall TECHNOLOGIST PROVIDED HISTORY: Has a \"code stroke\" or \"stroke alert\" been called? ->No Ordering Physician Provided Reason for Exam: fall Acuity: Acute Type of Exam: Initial Is history of hypertension FINDINGS: Motion artifact degrades many of the images. BRAIN/VENTRICLES: There is no acute intracranial hemorrhage, mass effect or midline shift. No abnormal extra-axial fluid collection. The gray-white differentiation is maintained without evidence of an acute infarct. There is no evidence of hydrocephalus. Again identified is central atrophy and chronic white matter disease. Encephalomalacia within the left frontal lobe is compatible with remote insult, likely infarct, unchanged. ORBITS: The visualized portion of the orbits demonstrate no acute abnormality. SINUSES: Left sphenoid sinus disease is decreased; there is a small amount of frothy material remaining in the left sphenoid sinus. SOFT TISSUES/SKULL:  No acute abnormality of the visualized skull or soft tissues. No acute intracranial abnormality. Ct Head Wo Contrast    Result Date: 6/10/2019  EXAMINATION: CT OF THE HEAD WITHOUT CONTRAST  6/10/2019 5:27 pm TECHNIQUE: CT of the head was performed without the administration of intravenous contrast. Dose modulation, iterative reconstruction, and/or weight based adjustment of the mA/kV was utilized to reduce the radiation dose to as low as reasonably achievable. COMPARISON: 06/09/2019 and 05/31/2019 HISTORY: ORDERING SYSTEM PROVIDED HISTORY: ABNORMAL GAIT (ATAXIA) TECHNOLOGIST PROVIDED HISTORY: Has a \"code stroke\" or \"stroke alert\" been called? ->Yes Ordering Physician Provided Reason for Exam: Abnormal gait (ataxia); Stroke Acuity: Unknown Type of Exam: Unknown FINDINGS: BRAIN/VENTRICLES: There is generalized atrophy. There is unchanged left frontal lobe encephalomalacia consistent with a remote infarct. There is patchy periventricular and subcortical white matter low attenuation that is nonspecific but most consistent with chronic small vessel ischemia. There are few bilateral basal ganglia calcifications. No evidence of acute hemorrhage, mass or extra-axial fluid collection. ORBITS: The visualized portion of the orbits demonstrate no acute abnormality. SINUSES: There is unchanged disease in the left sphenoid locule. Disease has increased from 05/31/2019 paranasal sinuses are otherwise clear. Mastoids are poorly aerated on a developmental basis. SOFT TISSUES/SKULL:  No acute abnormality of the visualized skull or soft tissues. Generalized atrophy and chronic ischemic changes as above including remote left frontal infarct. No acute intracranial abnormality. Sphenoid sinus disease has increased from 05/31/2019. Findings were discussed with MICHELLE MCNALLY at 6:16 pm on 6/10/2019. Ct Head Wo Contrast    Result Date: 6/9/2019  EXAMINATION: CT OF THE HEAD WITHOUT CONTRAST  6/9/2019 3:31 pm TECHNIQUE: CT of the head was performed without the administration of intravenous contrast. Dose modulation, iterative reconstruction, and/or weight based adjustment of the mA/kV was utilized to reduce the radiation dose to as low as reasonably achievable. COMPARISON: CT 05/31/2019 HISTORY: ORDERING SYSTEM PROVIDED HISTORY: fall, head injury TECHNOLOGIST PROVIDED HISTORY: Has a \"code stroke\" or \"stroke alert\" been called? ->No Ordering Physician Provided Reason for Exam: Fall (Pt. comes in by [facility] home after she fell. Daughter was with pt. when she fell.  Pt. felt dizzy prior to falling, daughter states she went backwards and hit her head on her bed. Daughter also concerned becuase left hip is bothering her and had not had it looked at.) Acuity: Acute Type of Exam: Initial Mechanism of Injury: fall FINDINGS: BRAIN/VENTRICLES: There is no acute intracranial hemorrhage, mass effect or midline shift. No abnormal extra-axial fluid collection. The gray-white differentiation is maintained without evidence of an acute infarct. There is no evidence of hydrocephalus. Encephalomalacia in the left frontal lobe is unchanged. Mild atrophic changes. Moderate periventricular white matter hypoattenuation, likely due to small vessel ischemic disease. Remote lacunar infarct in right basal ganglia. ORBITS: The visualized portion of the orbits demonstrate no acute abnormality. SINUSES: Mild mucosal thickening in sphenoid sinus. SOFT TISSUES/SKULL:  No acute abnormality of the visualized skull or soft tissues. No acute intracranial abnormality. Ct Head Wo Contrast    Result Date: 5/31/2019  EXAMINATION: CT OF THE HEAD WITHOUT CONTRAST  5/31/2019 3:27 pm TECHNIQUE: CT of the head was performed without the administration of intravenous contrast. Dose modulation, iterative reconstruction, and/or weight based adjustment of the mA/kV was utilized to reduce the radiation dose to as low as reasonably achievable. COMPARISON: October 15, 2017 HISTORY: ORDERING SYSTEM PROVIDED HISTORY: head injury TECHNOLOGIST PROVIDED HISTORY: Has a \"code stroke\" or \"stroke alert\" been called? ->No Ordering Physician Provided Reason for Exam: Fall (pt brought in by [facility] from [facility]. Pt c/o headache after a witness mechanical fall this am at appox 0830. Did hit forehead. Denies LOC. Pt was monitored at facility and given tyelonel for pain. Pt is on blood thinners . At appox 1400 [facility] called 911 due to pt reporting a headache. Pt hx of Dementia. Squad and family reports pt at baseline.  A&O to self.) Acuity: Acute Type of Exam: Initial Mechanism of Injury: fall FINDINGS: central canal narrowing is seen. SOFT TISSUES: No acute abnormality of the paraspinal soft tissues. Some septal thickening and ground-glass opacity are noted within the upper lungs. Scarring in the posterior right limit apex is present. No acute abnormality of the cervical spine. Ground-glass opacity in septal thickening within lung apices. Correlation for mild edema is recommended. Ct Cervical Spine Wo Contrast    Result Date: 6/9/2019  EXAMINATION: CT OF THE CERVICAL SPINE WITHOUT CONTRAST 6/9/2019 3:31 pm TECHNIQUE: CT of the cervical spine was performed without the administration of intravenous contrast. Multiplanar reformatted images are provided for review. Dose modulation, iterative reconstruction, and/or weight based adjustment of the mA/kV was utilized to reduce the radiation dose to as low as reasonably achievable. COMPARISON: 05/31/2019 HISTORY: ORDERING SYSTEM PROVIDED HISTORY: fall, head injury TECHNOLOGIST PROVIDED HISTORY: If patient is on cardiac monitor and/or pulse ox, they may be taken off cardiac monitor and pulse ox, left on O2 if currently on. All monitors reattached when patient returns to room. Ordering Physician Provided Reason for Exam: Fall (Pt. comes in by [facility] after she fell. Daughter was with pt. when she fell. Pt. felt dizzy prior to falling, daughter states she went backwards and hit her head on her bed. Daughter also concerned becuase left hip is bothering her and had not had it looked at.) Acuity: Acute Type of Exam: Initial Mechanism of Injury: fall FINDINGS: BONES/ALIGNMENT: Multiple contiguous axial images were obtained of the cervical spine. Sagittal and coronal reformats were obtained. Multilevel disc space narrowing is seen with endplate osteophytosis, compatible with degenerative disc disease. Vacuum phenomenon at C4-C5 and C5-C6. No spondylolisthesis. Moderate spurring is seen at C1-C2, similar to prior.   No marked vertebral body height loss.  No gross fracture is seen. DEGENERATIVE CHANGES: As above SOFT TISSUES: Bilateral carotid artery calcification. Mucosal thickening of sphenoid sinus, incompletely imaged. Multilevel degenerative disc disease, without spondylolisthesis or gross fracture. Ct Cervical Spine Wo Contrast    Result Date: 5/31/2019  EXAMINATION: CT OF THE CERVICAL SPINE WITHOUT CONTRAST 5/31/2019 3:27 pm TECHNIQUE: CT of the cervical spine was performed without the administration of intravenous contrast. Multiplanar reformatted images are provided for review. Dose modulation, iterative reconstruction, and/or weight based adjustment of the mA/kV was utilized to reduce the radiation dose to as low as reasonably achievable. COMPARISON: None. HISTORY: ORDERING SYSTEM PROVIDED HISTORY: fall, injury TECHNOLOGIST PROVIDED HISTORY: Ordering Physician Provided Reason for Exam: Fall (pt brought in by [facility] from [place]. Pt c/o headache after a witness mechanical fall this am at appox 0830. Did hit forehead. Denies LOC. Pt was monitored at facility and given tyelonel for pain. Pt is on blood thinners . At appox 1400 NH called 911 due to pt reporting a headache. Pt hx of Dementia. Squad and family reports pt at baseline. A&O to self.) Acuity: Acute Type of Exam: Initial Mechanism of Injury: fall FINDINGS: BONES/ALIGNMENT: No evidence of fracture or subluxation DEGENERATIVE CHANGES: Degenerative disc disease C4-C5 through C6-C7. Facet osteoarthritis C2-C3 on the right, and C3-C4 and C7-T1 on the left. SOFT TISSUES: There is no prevertebral soft tissue swelling. No acute abnormality of the cervical spine. Ct Lumbar Spine Wo Contrast    Result Date: 6/9/2019  EXAMINATION: CT OF THE LUMBAR SPINE WITHOUT CONTRAST,  6/9/2019 TECHNIQUE: CT of the lumbar spine was performed without the administration of intravenous contrast. Multiplanar reformatted images are provided for review.  Dose modulation, iterative reconstruction, and/or weight based adjustment of the mA/kV was utilized to reduce the radiation dose to as low as reasonably achievable. COMPARISON: None HISTORY: ORDERING SYSTEM PROVIDED HISTORY: Fall, pain TECHNOLOGIST PROVIDED HISTORY: Reason for exam:->Fall, pain Ordering Physician Provided Reason for Exam: Fall (Pt. comes in by [facility] home after she fell. Daughter was with pt. when she fell. Pt. felt dizzy prior to falling, daughter states she went backwards and hit her head on her bed. Daughter also concerned because left hip is bothering her and had not had it looked at.) Acuity: Acute Type of Exam: Initial Mechanism of Injury: Fall FINDINGS: BONES/ALIGNMENT: No acute fracture of the lumbar spine. Anterolisthesis of L4 on L5 measures 6 mm. DEGENERATIVE CHANGES: Severe degenerative disc disease is identified at L2-3. Moderate degenerative disc disease is identified at L3-4 and L5-S1. Findings are associated with endplate sclerosis and spurring. Severe facet arthropathy is seen at L3-4, L4-5, and L5-S1. SOFT TISSUES/RETROPERITONEUM: No paraspinal mass is seen. Other: Severe atherosclerosis. 1. No acute osseous abnormality of the lumbar spine. 2. Multilevel degenerative changes as described. Us Renal Complete    Result Date: 6/10/2019  EXAMINATION: RIGHT UPPER QUADRANT ULTRASOUND 6/10/2019 2:18 pm COMPARISON: CT 08/13/2016 HISTORY: ORDERING SYSTEM PROVIDED HISTORY: cirrhosis, eval for ascites, eval for liver mass TECHNOLOGIST PROVIDED HISTORY: Reason for exam:->cirrhosis, eval for ascites, eval for liver mass Ordering Physician Provided Reason for Exam: cirrhosis/Eval for liver mass and ascites Acuity: Chronic Type of Exam: Initial Additional signs and symptoms: GB removed 1970s Relevant Medical/Surgical History: na FINDINGS: LIVER:  The liver demonstrates normal echogenicity without evidence of intrahepatic biliary ductal dilatation. BILIARY SYSTEM:  Prior cholecystectomy.  Common bile duct is within normal limits measuring 6 mm. KIDNEYS: Right kidney measures 8.3 cm. Left kidney measures 9.5 cm. No hydronephrosis. Focal lesion or calculus. BLADDER: Unremarkable appearance of the bladder. PANCREAS:  Visualized portions of the pancreas are unremarkable. OTHER: No evidence of right upper quadrant ascites. Unremarkable right upper quadrant ultrasound. If there is concern for liver mass consider liver protocol MRI. No hydronephrosis. Xr Chest Portable    Result Date: 6/9/2019  EXAMINATION: ONE XRAY VIEW OF THE CHEST 6/9/2019 3:24 pm COMPARISON: None HISTORY: ORDERING SYSTEM PROVIDED HISTORY: Chest Discomfort TECHNOLOGIST PROVIDED HISTORY: Reason for exam:->Chest Discomfort Ordering Physician Provided Reason for Exam: recent fall Acuity: Acute Relevant Medical/Surgical History: dementia FINDINGS: Frontal view of the chest demonstrates no lines or tubes. Cardiomediastinal silhouette is mildly prominent. The lungs are low in volume. Pulmonary edema is identified. No dense consolidation or pleural effusion. No pneumothorax. No acute osseous abnormality. 1. Mild pulmonary edema. Us Abdomen Limited    Result Date: 6/10/2019  EXAMINATION: RIGHT UPPER QUADRANT ULTRASOUND 6/10/2019 2:18 pm COMPARISON: CT 08/13/2016 HISTORY: ORDERING SYSTEM PROVIDED HISTORY: cirrhosis, eval for ascites, eval for liver mass TECHNOLOGIST PROVIDED HISTORY: Reason for exam:->cirrhosis, eval for ascites, eval for liver mass Ordering Physician Provided Reason for Exam: cirrhosis/Eval for liver mass and ascites Acuity: Chronic Type of Exam: Initial Additional signs and symptoms: GB removed 1970s Relevant Medical/Surgical History: na FINDINGS: LIVER:  The liver demonstrates normal echogenicity without evidence of intrahepatic biliary ductal dilatation. BILIARY SYSTEM:  Prior cholecystectomy. Common bile duct is within normal limits measuring 6 mm. KIDNEYS: Right kidney measures 8.3 cm.   Left kidney measures 9.5 cm. No hydronephrosis. Focal lesion or calculus. BLADDER: Unremarkable appearance of the bladder. PANCREAS:  Visualized portions of the pancreas are unremarkable. OTHER: No evidence of right upper quadrant ascites. Unremarkable right upper quadrant ultrasound. If there is concern for liver mass consider liver protocol MRI. No hydronephrosis. Ct Chest Abdomen Pelvis W Contrast    Result Date: 6/18/2019  EXAMINATION: CT OF THE CHEST, ABDOMEN, AND PELVIS WITH CONTRAST 6/18/2019 9:14 pm TECHNIQUE: CT of the chest, abdomen and pelvis was performed with the administration of intravenous contrast. Multiplanar reformatted images are provided for review. Dose modulation, iterative reconstruction, and/or weight based adjustment of the mA/kV was utilized to reduce the radiation dose to as low as reasonably achievable. COMPARISON: 06/09/2019 and 08/13/2016 HISTORY: ORDERING SYSTEM PROVIDED HISTORY: fall TECHNOLOGIST PROVIDED HISTORY: Additional Contrast?->None Ordering Physician Provided Reason for Exam: fall Acuity: Acute Type of Exam: Initial FINDINGS: Chest: Mediastinum: Coronary artery calcifications are a marker of atherosclerosis. There are no enlarged thoracic lymph nodes. Calcified granulomatous disease is noted. Small esophageal varices are noted with a trace amount of fluid in the middle mediastinum. Lungs/pleura: The airway is patent. There is no pneumothorax there are small right and trace left pleural effusions with atelectasis. Within the right lower lobe, there is an enlarging oblong 1.0 x 1.8 cm solid pulmonary nodule, previously measuring 1.0 x 1.3 cm. .  An adjacent pulmonary artery is noted and may represent a pulmonary arteriovenous malformation. Soft Tissues/Bones: Degenerative changes involve the thoracic spine. The bones are demineralized. Abdomen/Pelvis: Organs: The spleen, adrenal glands and kidneys are unremarkable. Status post cholecystectomy.   There is bilateral cortical HISTORY: nausea and vomiting TECHNOLOGIST PROVIDED HISTORY: Reason for exam:->nausea and vomiting Ordering Physician Provided Reason for Exam: nausea and vomiting Acuity: Unknown Type of Exam: Unknown FINDINGS: Low lung volumes. Patchy bilateral airspace disease in the visualized lungs. Right costophrenic angle blunting consistent with small pleural effusion. Gas is visible in normal caliber colon. There is a lack of small bowel gas. No suspicious abdominal calcification is seen. Indeterminate bowel-gas pattern due to lack of small bowel gas. Patchy bilateral airspace disease and small right pleural effusion. EKG: from ER, interpreted by self. Sinus rhythm at 77. No acute st elevation, no TWI. Comparison is made to old ekg in chart dated 9/1/15 - shows similar rate and morphology          MEDICAL DECISION MAKING:    Patient Active Hospital Problem List:   UTI (urinary tract infection) (6/18/2019)    Assessment: New Problem to me. Pt with evidence of UTI on admit. Plan: admit, empiric iv abx. Blood and urine cx sent. High cholesterol (6/9/2019)    Assessment: Established problem. Stable. Plan: on statin, to continue   HTN (hypertension) (6/9/2019)    Assessment: Established problem. Stable. 109/66    Plan: Pt home BP meds reviewed and will be continued. Will monitor labs to assess Creat/K for possible complications of medications. Hypothyroid (6/9/2019)    Assessment: Established problem. Stable. Plan: cont on synthroid   Frequent falls    Assessment: New Problem to me. Plan: pt/ot to eval      Diagnoses as listed above, designated as new or established and plan outlined for each. Data Reviewed:   (1) Lab tests were reviewed or ordered. (1) Radiology tests were reviewed or ordered. (1) Medical test (Echo, EKG, PFT/robina) were not ordered.   (1)History was obtained from someone other than patient  (1) Old records  were reviewed - see HPI/MDM for pertinent details if review

## 2019-06-19 NOTE — ED NOTES
Pt report given to , RN, states no questions or concerns at this time. Pt transferred via bed by this RN with sodium chloride infusing at time of tranfers.       Frances Mendez RN  06/19/19 0025

## 2019-06-20 NOTE — PROGRESS NOTES
Occupational Therapy   Occupational Therapy Initial Assessment  Date: 2019   Patient Name: Royal New  MRN: [id number]     : 1934    Date of Service: 2019    Discharge Recommendations:  Royal New scored a 14/24 on the AM-PAC ADL Inpatient form. Current research shows that an AM-PAC score of 17 or less is typically not associated with a discharge to the patient's home setting. Based on the patients AM-PAC score and their current ADL deficits, it is recommended that the patient have 3-5 sessions per week of Occupational Therapy at d/c to increase the patients independence. OT Equipment Recommendations  Equipment Needed: No    Assessment   Performance deficits / Impairments: Decreased functional mobility ; Decreased endurance;Decreased ADL status; Decreased safe awareness;Decreased sensation;Decreased cognition  Assessment: Pt is not at baseline of occupational function as evidenced by the above deficits associated w/ UTI. Pt would benefit from continued skilled acute OT services to address these deficits  Treatment Diagnosis: Decreased functional mobility, endurance, ADL status, safety awareness, sensation and cognition associated w/ UTI. Prognosis: Fair  Decision Making: Medium Complexity  History: Pt 79 yo w/ expressive aphagia living at [address] [place] [address], needing assistance with ADLs/IADLs. PMH: anemia, HLD, HTN, nonalcoholic fatty liver disease, thyroid disease, and stroke  Exam: ROM, MMT, 6-Clicks, 6 performance deficits/impairments, stable presentation  Assistance / Modification: Min A sit<>stand, CGA functional mobility, Min A varying to SBA for grooming in stance at sink. Max verbal and tactile cues.   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 Safety Devices  Safety Devices in place: Yes  Type of devices: Left in chair; All fall risk precautions in place;Call light within reach;Gait belt;Nurse notified; Chair alarm in place; Patient at risk for falls  Restraints  Initially in place: No           Patient Diagnosis(es): The primary encounter diagnosis was Fall from bed, initial encounter. Diagnoses of Dementia without behavioral disturbance, unspecified dementia type, Acute cystitis without hematuria, Hyperammonemia ([address] Lung nodule, and Pancreatic cyst were also pertinent to this visit. has a past medical history of Anemia, Hyperlipidemia, Hypertension, Nonalcoholic fatty liver disease, Thyroid disease, and Unspecified cerebral artery occlusion with cerebral infarction. has a past surgical history that includes Cholecystectomy and Hysterectomy. Treatment Diagnosis: Decreased functional mobility, endurance, ADL status, safety awareness, sensation and cognition associated w/ UTI. Restrictions  Restrictions/Precautions  Restrictions/Precautions: Fall Risk(high fall risk, low sodium diet)  Required Braces or Orthoses?: No  Position Activity Restriction  Other position/activity restrictions: Ras Smith is a 80 y.o. female with past medical history of anemia, hyperlipidemia, hypertension, thyroid disease and previous CVA who presents the ED with complaint of a fall. Apparently was found on the ground wedged between the bed. EMS states they were unsure how long patient was on the ground but upon discussion with family at bedside family was in the room just left him when they returned a few minutes later she was on the ground. Fall was not witnessed. Patient is demented and very difficult historian at this time.     Subjective   General  Chart Reviewed: Yes  Patient assessed for rehabilitation services?: Yes  Family / Caregiver Present: Yes(Daughter coming in at end of eval)  Referring Practitioner: Becki Nicholas PA-C  Diagnosis: UTI  Subjective  Subjective: Pt supine in bed, pleasant and agreeable to OT eval.  Pain Assessment  Pain Assessment: 0-10  Pain Level: 0  Oxygen Therapy  Pulse Oximeter Device Mode: Intermittent  Pulse Oximeter Device Location: Left;Finger  O2 Device: None (Room air)  Skin Protection for O2 Device: N/A  Blood Gas  Performed?: No  Social/Functional History  Social/Functional History  Lives With: (pt lives in LTC w/ shared bathroom)  Type of Home: Facility(LTC)  Home Layout: One level  Home Access: Level entry  Bathroom Shower/Tub: Walk-in shower  Bathroom Toilet: Handicap height  Bathroom Accessibility: Accessible  Receives Help From: Personal care attendant  ADL Assistance: Needs assistance  Bath: Minimal assistance  Dressing: Minimal assistance  Homemaking Assistance: Needs assistance. Homemaking Responsibilities: . Ambulation Assistance: Independent. Transfer Assistance: Independent. Active : No.  Patient's  Info: daughter   Mode of Transportation: Truck  Occupation: Retired  Type of occupation:  for 35 years  [address]: walk around [employer]  Additional Comments: Pt was unable to express number of falls in last 6mo but has had at least one fall that caused her current admission. Daughter reported pt having 4 recent falls.        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 stroke)  Observation/Palpation  Posture: Good  Balance  Sitting Balance: Stand by assistance(sitting EOB; recliner)  Standing Balance: Minimal assistance(Min A varying to CGA and SBA while safety;Decreased awareness of need for assistance  Problem Solving: Assistance required to implement solutions;Assistance required to identify errors made;Decreased awareness of errors;Assistance required to generate solutions;Assistance required to correct errors made  Insights: Not aware of deficits  Initiation: Requires cues for all  Sequencing: Requires cues for all  Perception  Overall Perceptual Status: Impaired  Initiation: Cues to initiate tasks(cues to initiate standing and ambulating.  Pt inconsistently responding to cues)  Perseveration: Perseverates during ADLs     Sensation  Overall Sensation Status: Impaired(Pt stating \"no\" multiple times when asked if she could feel therapist touching Blanche Speaks in various locations.)        LUE AROM (degrees)  LUE AROM : WFL  Left Hand AROM (degrees)  Left Hand AROM: WFL  RUE AROM (degrees)  RUE AROM : WFL  Right Hand AROM (degrees)  Right Hand AROM: WFL  LUE Strength  L Hand General: 4-/5  LUE Strength Comment: Unable to assess d/t pt not responding to verbal and tactile cues for MMT  RUE Strength  R Hand General: 4-/5  RUE Strength Comment: Unable to assess d/t pt not responding to verbal and tactile cues for MMT                   Plan   Plan  Times per week: 3-5  Times per day: Daily  Current Treatment Recommendations: Functional Mobility Training, Endurance Training, Safety Education & Training, Self-Care / ADL, Strengthening    AM-PAC Score        [facility] Inpatient Daily Activity Raw Score: 14 (06/20/19 1108)  AM-PAC Inpatient ADL T-Scale Score : 33.39 (06/20/19 1108)  ADL Inpatient CMS 0-100% Score: 59.67 (06/20/19 1108)  ADL Inpatient CMS G-Code Modifier : CK (06/20/19 1108)    Goals  Short term goals  Time Frame for Short term goals: Discharge  Short term goal 1: SBA for functional transfers to ADL surfaces w/RW  Short term goal 2: Min A for UE bathing/dressing w/ verbal and tactile cues for initiating tasks  Short term goal 3: Min A for LE dressing/bathing w/ verbal and tactile cues for initiating tasks  Short term goal 4: SBA for grooming tasks in stance at sink w/RW  Short term goal 5: SBA for functional mobility for ADL activities w/RW       Therapy Time   Individual Concurrent Group Co-treatment   Time In 0941         Time Out 1019         Minutes 38              Timed Code Treatment Minutes:  23  Total Treatment Minutes:  [facility] S/OT  C/ [address], OT   I have directly observed this treatment and have read and approve this note.    Elkin Mendoza, OTR/L, [id number]

## 2019-06-20 NOTE — PROGRESS NOTES
Progress Note - Dr. Frazad Chapa - Internal Medicine  PCP: Nakia Gutierrez  [facility] / [address] [place] [address] Place [phone]    Hospital Day: 2  Code Status: DNR-CC  Current Diet: DIET LOW SODIUM 2 GM;        CC: follow up on medical issues    Subjective:   Sammy Carney is a 80 y.o. female. She denies problems    Pt is more alert  Working with therapy currently  Case d/w pt's daughter - all questions answered  Case d/w dr Sutton President    Urine cx results  Susceptibility     Escherichia coli (1)     Antibiotic Interpretation CALLIE Status    amoxicillin-clavulanate Resistant >16/8 mcg/mL     ampicillin Resistant >16 mcg/mL     ceFAZolin Resistant 8 mcg/mL      NOTE: Cefazolin should only be used for uncomplicated UTI        for E.coli or Klebsiella pneumoniae. cefepime Sensitive <=1 mcg/mL     cefTRIAXone Sensitive <=1 mcg/mL     ciprofloxacin Sensitive <=0.5 mcg/mL     gentamicin Sensitive <=2 mcg/mL     levofloxacin Sensitive <=1 mcg/mL     meropenem Sensitive <=0.25 mcg/mL     nitrofurantoin Sensitive 32 mcg/mL     piperacillin-tazobactam Sensitive 4/4 mcg/mL     tetracycline Sensitive <=2 mcg/mL     trimethoprim-sulfamethoxazole Sensitive <=0.5/9.5 mcg/mL           She denies chest pain, denies shortness of breath, denies nausea,  denies emesis. 10 system Review of Systems is reviewed with patient, and pertinent positives are listed here: None . Otherwise, Review of systems is negative. I have reviewed the patient's medical and social history in detail and updated the computerized patient record. To recap: She  has a past medical history of Anemia, Hyperlipidemia, Hypertension, Nonalcoholic fatty liver disease, Thyroid disease, and Unspecified cerebral artery occlusion with cerebral infarction. . She  has a past surgical history that includes Cholecystectomy and Hysterectomy. . She  reports that she has never smoked.  She has never used smokeless tobacco. She reports that she does not drink alcohol or use drugs..        Active Hospital Problems    Diagnosis Date Noted    UTI (urinary tract infection) [N39.0] 06/18/2019    HTN (hypertension) [I10] 06/09/2019    High cholesterol [E78.00] 06/09/2019    Hypothyroid [E03.9] 06/09/2019       Current Facility-Administered Medications: acetaminophen (TYLENOL) tablet 650 mg, 650 mg, Oral, Q6H PRN  amLODIPine (NORVASC) tablet 10 mg, 10 mg, Oral, Daily  atorvastatin (LIPITOR) tablet 80 mg, 80 mg, Oral, Nightly  clopidogrel (PLAVIX) tablet 75 mg, 75 mg, Oral, Daily  famotidine (PEPCID) tablet 20 mg, 20 mg, Oral, Daily  ferrous gluconate 324 (37.5 Fe) MG tablet 324 mg, 324 mg, Oral, Daily with breakfast  gabapentin (NEURONTIN) capsule 300 mg, 300 mg, Oral, 4x Daily  hydrocortisone 1 % cream, , Topical, BID  levothyroxine (SYNTHROID) tablet 125 mcg, 125 mcg, Oral, Daily  meclizine (ANTIVERT) tablet 12.5 mg, 12.5 mg, Oral, TID PRN  benzocaine-menthol (CEPACOL SORE THROAT) lozenge 1 lozenge, 1 lozenge, Buccal, Q2H PRN  miconazole (MICOTIN) 2 % powder, , Topical, BID PRN  potassium chloride (KLOR-CON) extended release tablet 10 mEq, 10 mEq, Oral, Daily  rifaximin (XIFAXAN) tablet 550 mg, 550 mg, Oral, BID  vitamin D (CHOLECALCIFEROL) tablet 1,000 Units, 1 tablet, Oral, Daily  0.9 % sodium chloride infusion, , Intravenous, Continuous  sodium chloride flush 0.9 % injection 10 mL, 10 mL, Intravenous, 2 times per day  sodium chloride flush 0.9 % injection 10 mL, 10 mL, Intravenous, PRN  magnesium hydroxide (MILK OF MAGNESIA) 400 MG/5ML suspension 30 mL, 30 mL, Oral, Daily PRN  ondansetron (ZOFRAN) injection 4 mg, 4 mg, Intravenous, Q6H PRN  cefTRIAXone (ROCEPHIN) 1 g in sterile water 10 mL IV syringe, 1 g, Intravenous, Q24H  lactulose (CHRONULAC) 10 GM/15ML solution 20 g, 20 g, Oral, 4 times per day  warfarin (COUMADIN) tablet 6.5 mg, 6.5 mg, Oral, Daily         Objective:  BP (!) 165/83   Pulse 78   Temp 97.4 °F (36.3 °C) (Oral)   Resp 14   Ht 5' 4\" (1.626 m)   Wt 206 lb 14.4 unsure of when see last per squad [facility]CF didn't know. Pt alert and orientated. Pt with dementia inable to tell RN when she feel. Pt complaining of abdominal pain. Pt on coumedin and plavix.) Acuity: Unknown Type of Exam: Unknown FINDINGS: The bones appear mildly demineralized and show degenerative changes. No acute fracture or dislocation is identified. No acute bony abnormality. Xr Femur Left (min 2 Views)    Result Date: 6/18/2019  EXAMINATION: 5 XRAY VIEWS OF THE LEFT FEMUR 6/18/2019 8:49 pm COMPARISON: None. HISTORY: ORDERING SYSTEM PROVIDED HISTORY: inj TECHNOLOGIST PROVIDED HISTORY: Reason for exam:->inj Ordering Physician Provided Reason for Exam: Fall (Pt in from [facility] found on floor wedged under bed, unsure of when see last per squad [facility] didn't know. Pt alert and orientated. Pt with dementia inable to tell RN when she feel. Pt complaining of abdominal pain. Pt on coumedin and plavix.) Acuity: Unknown Type of Exam: Unknown FINDINGS: There is no acute fracture. The bones are demineralized. There are no bony destructive lesions. Degenerative changes involve the knee and hip. 1. No acute abnormality. Ct Head Wo Contrast    Result Date: 6/18/2019  EXAMINATION: CT OF THE HEAD WITHOUT CONTRAST  6/18/2019 9:14 pm TECHNIQUE: CT of the head was performed without the administration of intravenous contrast. Dose modulation, iterative reconstruction, and/or weight based adjustment of the mA/kV was utilized to reduce the radiation dose to as low as reasonably achievable. COMPARISON: 06/10/2018 HISTORY: ORDERING SYSTEM PROVIDED HISTORY: fall TECHNOLOGIST PROVIDED HISTORY: Has a \"code stroke\" or \"stroke alert\" been called? ->No Ordering Physician Provided Reason for Exam: fall Acuity: Acute Type of Exam: Initial Is history of hypertension FINDINGS: Motion artifact degrades many of the images. BRAIN/VENTRICLES: There is no acute intracranial hemorrhage, mass effect or midline shift.   No abnormal extra-axial fluid collection. The gray-white differentiation is maintained without evidence of an acute infarct. There is no evidence of hydrocephalus. Again identified is central atrophy and chronic white matter disease. Encephalomalacia within the left frontal lobe is compatible with remote insult, likely infarct, unchanged. ORBITS: The visualized portion of the orbits demonstrate no acute abnormality. SINUSES: Left sphenoid sinus disease is decreased; there is a small amount of frothy material remaining in the left sphenoid sinus. SOFT TISSUES/SKULL:  No acute abnormality of the visualized skull or soft tissues. No acute intracranial abnormality. Ct Head Wo Contrast    Result Date: 6/10/2019  EXAMINATION: CT OF THE HEAD WITHOUT CONTRAST  6/10/2019 5:27 pm TECHNIQUE: CT of the head was performed without the administration of intravenous contrast. Dose modulation, iterative reconstruction, and/or weight based adjustment of the mA/kV was utilized to reduce the radiation dose to as low as reasonably achievable. COMPARISON: 06/09/2019 and 05/31/2019 HISTORY: ORDERING SYSTEM PROVIDED HISTORY: ABNORMAL GAIT (ATAXIA) TECHNOLOGIST PROVIDED HISTORY: Has a \"code stroke\" or \"stroke alert\" been called? ->Yes Ordering Physician Provided Reason for Exam: Abnormal gait (ataxia); Stroke Acuity: Unknown Type of Exam: Unknown FINDINGS: BRAIN/VENTRICLES: There is generalized atrophy. There is unchanged left frontal lobe encephalomalacia consistent with a remote infarct. There is patchy periventricular and subcortical white matter low attenuation that is nonspecific but most consistent with chronic small vessel ischemia. There are few bilateral basal ganglia calcifications. No evidence of acute hemorrhage, mass or extra-axial fluid collection. ORBITS: The visualized portion of the orbits demonstrate no acute abnormality. SINUSES: There is unchanged disease in the left sphenoid locule.   Disease has increased from 05/31/2019 abnormality. SINUSES: Mild mucosal thickening in sphenoid sinus. SOFT TISSUES/SKULL:  No acute abnormality of the visualized skull or soft tissues. No acute intracranial abnormality. Ct Head Wo Contrast    Result Date: 5/31/2019  EXAMINATION: CT OF THE HEAD WITHOUT CONTRAST  5/31/2019 3:27 pm TECHNIQUE: CT of the head was performed without the administration of intravenous contrast. Dose modulation, iterative reconstruction, and/or weight based adjustment of the mA/kV was utilized to reduce the radiation dose to as low as reasonably achievable. COMPARISON: October 15, 2017 HISTORY: ORDERING SYSTEM PROVIDED HISTORY: head injury TECHNOLOGIST PROVIDED HISTORY: Has a \"code stroke\" or \"stroke alert\" been called? ->No Ordering Physician Provided Reason for Exam: Fall (pt brought in by [facility] from [facility]. Pt c/o headache after a witness mechanical fall this am at appox 0830. Did hit forehead. Denies LOC. Pt was monitored at facility and given tyelonel for pain. Pt is on blood thinners . At appox 1400 [facility] called 911 due to pt reporting a headache. Pt hx of Dementia. Squad and family reports pt at baseline. A&O to self.) Acuity: Acute Type of Exam: Initial Mechanism of Injury: fall FINDINGS: BRAIN/VENTRICLES: There is no acute intracranial hemorrhage, mass effect or midline shift. No abnormal extra-axial fluid collection. The gray-white differentiation is maintained without evidence of an acute infarct. There is no evidence of hydrocephalus. Encephalomalacia related to remote infarct of the left frontal lobe again noted. ORBITS: The visualized portion of the orbits demonstrate no acute abnormality. SINUSES: The visualized paranasal sinuses and mastoid air cells demonstrate no acute abnormality. SOFT TISSUES/SKULL:  No acute abnormality of the visualized skull or soft tissues. No acute intracranial abnormality.      Ct Cervical Spine Wo Contrast    Result Date: 6/18/2019  EXAMINATION: CT OF THE CERVICAL SPINE WITHOUT CONTRAST 6/18/2019 9:15 pm TECHNIQUE: CT of the cervical spine was performed without the administration of intravenous contrast. Multiplanar reformatted images are provided for review. Dose modulation, iterative reconstruction, and/or weight based adjustment of the mA/kV was utilized to reduce the radiation dose to as low as reasonably achievable. COMPARISON: 06/09/2019 HISTORY: ORDERING SYSTEM PROVIDED HISTORY: fall TECHNOLOGIST PROVIDED HISTORY: Ordering Physician Provided Reason for Exam: Fall (Pt in from ECF found on floor wedged under bed, unsure of when see last per squad ECF didn't know. Pt alert and orientated. Pt with dementia inable to tell RN when she feel. Pt complaining of abdominal pain. Pt on coumedin and plavix.) Acuity: Acute Type of Exam: Initial FINDINGS: BONES/ALIGNMENT: There is no evidence of an acute cervical spine fracture. There is normal alignment of the cervical spine. DEGENERATIVE CHANGES: Mild-to-moderate multilevel degenerative disc disease is identified, primarily a C4-5 and C5-6, with disc space narrowing, endplate sclerosis, and spurring. Mild-to-moderate facet hypertrophy changes are also present, particularly on the left at C3-4. No significant bony central canal narrowing is seen. SOFT TISSUES: No acute abnormality of the paraspinal soft tissues. Some septal thickening and ground-glass opacity are noted within the upper lungs. Scarring in the posterior right limit apex is present. No acute abnormality of the cervical spine. Ground-glass opacity in septal thickening within lung apices. Correlation for mild edema is recommended. Ct Cervical Spine Wo Contrast    Result Date: 6/9/2019  EXAMINATION: CT OF THE CERVICAL SPINE WITHOUT CONTRAST 6/9/2019 3:31 pm TECHNIQUE: CT of the cervical spine was performed without the administration of intravenous contrast. Multiplanar reformatted images are provided for review.  Dose modulation, iterative reconstruction, and/or weight based adjustment of the mA/kV was utilized to reduce the radiation dose to as low as reasonably achievable. COMPARISON: 05/31/2019 HISTORY: ORDERING SYSTEM PROVIDED HISTORY: fall, head injury TECHNOLOGIST PROVIDED HISTORY: If patient is on cardiac monitor and/or pulse ox, they may be taken off cardiac monitor and pulse ox, left on O2 if currently on. All monitors reattached when patient returns to room. Ordering Physician Provided Reason for Exam: Fall (Pt. comes in by [facility] home after she fell. Daughter was with pt. when she fell. Pt. felt dizzy prior to falling, daughter states she went backwards and hit her head on her bed. Daughter also concerned becuase left hip is bothering her and had not had it looked at.) Acuity: Acute Type of Exam: Initial Mechanism of Injury: fall FINDINGS: BONES/ALIGNMENT: Multiple contiguous axial images were obtained of the cervical spine. Sagittal and coronal reformats were obtained. Multilevel disc space narrowing is seen with endplate osteophytosis, compatible with degenerative disc disease. Vacuum phenomenon at C4-C5 and C5-C6. No spondylolisthesis. Moderate spurring is seen at C1-C2, similar to prior. No marked vertebral body height loss. No gross fracture is seen. DEGENERATIVE CHANGES: As above SOFT TISSUES: Bilateral carotid artery calcification. Mucosal thickening of sphenoid sinus, incompletely imaged. Multilevel degenerative disc disease, without spondylolisthesis or gross fracture. Ct Cervical Spine Wo Contrast    Result Date: 5/31/2019  EXAMINATION: CT OF THE CERVICAL SPINE WITHOUT CONTRAST 5/31/2019 3:27 pm TECHNIQUE: CT of the cervical spine was performed without the administration of intravenous contrast. Multiplanar reformatted images are provided for review.  Dose modulation, iterative reconstruction, and/or weight based adjustment of the mA/kV was utilized to reduce the radiation dose to as low as reasonably achievable. COMPARISON: None. HISTORY: ORDERING SYSTEM PROVIDED HISTORY: fall, injury TECHNOLOGIST PROVIDED HISTORY: Ordering Physician Provided Reason for Exam: Fall (pt brought in by [facility] from [facility]. Pt c/o headache after a witness mechanical fall this am at appox 0830. Did hit forehead. Denies LOC. Pt was monitored at facility and given tyelonel for pain. Pt is on blood thinners . At appox 1400 [facility] called 911 due to pt reporting a headache. Pt hx of Dementia. Squad and family reports pt at baseline. A&O to self.) Acuity: Acute Type of Exam: Initial Mechanism of Injury: fall FINDINGS: BONES/ALIGNMENT: No evidence of fracture or subluxation DEGENERATIVE CHANGES: Degenerative disc disease C4-C5 through C6-C7. Facet osteoarthritis C2-C3 on the right, and C3-C4 and C7-T1 on the left. SOFT TISSUES: There is no prevertebral soft tissue swelling. No acute abnormality of the cervical spine. Ct Lumbar Spine Wo Contrast    Result Date: 6/9/2019  EXAMINATION: CT OF THE LUMBAR SPINE WITHOUT CONTRAST,  6/9/2019 TECHNIQUE: CT of the lumbar spine was performed without the administration of intravenous contrast. Multiplanar reformatted images are provided for review. Dose modulation, iterative reconstruction, and/or weight based adjustment of the mA/kV was utilized to reduce the radiation dose to as low as reasonably achievable. COMPARISON: None HISTORY: ORDERING SYSTEM PROVIDED HISTORY: Fall, pain TECHNOLOGIST PROVIDED HISTORY: Reason for exam:->Fall, pain Ordering Physician Provided Reason for Exam: Fall (Pt. comes in by [facility] after she fell. Daughter was with pt. when she fell. Pt. felt dizzy prior to falling, daughter states she went backwards and hit her head on her bed.  Daughter also concerned because left hip is bothering her and had not had it looked at.) Acuity: Acute Type of Exam: Initial Mechanism of Injury: Fall FINDINGS: BONES/ALIGNMENT: No acute fracture of the lumbar spine. Anterolisthesis of L4 on L5 measures 6 mm. DEGENERATIVE CHANGES: Severe degenerative disc disease is identified at L2-3. Moderate degenerative disc disease is identified at L3-4 and L5-S1. Findings are associated with endplate sclerosis and spurring. Severe facet arthropathy is seen at L3-4, L4-5, and L5-S1. SOFT TISSUES/RETROPERITONEUM: No paraspinal mass is seen. Other: Severe atherosclerosis. 1. No acute osseous abnormality of the lumbar spine. 2. Multilevel degenerative changes as described. Us Renal Complete    Result Date: 6/10/2019  EXAMINATION: RIGHT UPPER QUADRANT ULTRASOUND 6/10/2019 2:18 pm COMPARISON: CT 08/13/2016 HISTORY: ORDERING SYSTEM PROVIDED HISTORY: cirrhosis, eval for ascites, eval for liver mass TECHNOLOGIST PROVIDED HISTORY: Reason for exam:->cirrhosis, eval for ascites, eval for liver mass Ordering Physician Provided Reason for Exam: cirrhosis/Eval for liver mass and ascites Acuity: Chronic Type of Exam: Initial Additional signs and symptoms: GB removed 1970s Relevant Medical/Surgical History: na FINDINGS: LIVER:  The liver demonstrates normal echogenicity without evidence of intrahepatic biliary ductal dilatation. BILIARY SYSTEM:  Prior cholecystectomy. Common bile duct is within normal limits measuring 6 mm. KIDNEYS: Right kidney measures 8.3 cm. Left kidney measures 9.5 cm. No hydronephrosis. Focal lesion or calculus. BLADDER: Unremarkable appearance of the bladder. PANCREAS:  Visualized portions of the pancreas are unremarkable. OTHER: No evidence of right upper quadrant ascites. Unremarkable right upper quadrant ultrasound. If there is concern for liver mass consider liver protocol MRI. No hydronephrosis.      Xr Chest Portable    Result Date: 6/9/2019  EXAMINATION: ONE XRAY VIEW OF THE CHEST 6/9/2019 3:24 pm COMPARISON: None HISTORY: ORDERING SYSTEM PROVIDED HISTORY: Chest Discomfort TECHNOLOGIST PROVIDED HISTORY: Reason for exam:->Chest Discomfort Ordering Physician Provided Reason for Exam: recent fall Acuity: Acute Relevant Medical/Surgical History: dementia FINDINGS: Frontal view of the chest demonstrates no lines or tubes. Cardiomediastinal silhouette is mildly prominent. The lungs are low in volume. Pulmonary edema is identified. No dense consolidation or pleural effusion. No pneumothorax. No acute osseous abnormality. 1. Mild pulmonary edema. Us Abdomen Limited    Result Date: 6/10/2019  EXAMINATION: RIGHT UPPER QUADRANT ULTRASOUND 6/10/2019 2:18 pm COMPARISON: CT 08/13/2016 HISTORY: ORDERING SYSTEM PROVIDED HISTORY: cirrhosis, eval for ascites, eval for liver mass TECHNOLOGIST PROVIDED HISTORY: Reason for exam:->cirrhosis, eval for ascites, eval for liver mass Ordering Physician Provided Reason for Exam: cirrhosis/Eval for liver mass and ascites Acuity: Chronic Type of Exam: Initial Additional signs and symptoms: GB removed 1970s Relevant Medical/Surgical History: na FINDINGS: LIVER:  The liver demonstrates normal echogenicity without evidence of intrahepatic biliary ductal dilatation. BILIARY SYSTEM:  Prior cholecystectomy. Common bile duct is within normal limits measuring 6 mm. KIDNEYS: Right kidney measures 8.3 cm. Left kidney measures 9.5 cm. No hydronephrosis. Focal lesion or calculus. BLADDER: Unremarkable appearance of the bladder. PANCREAS:  Visualized portions of the pancreas are unremarkable. OTHER: No evidence of right upper quadrant ascites. Unremarkable right upper quadrant ultrasound. If there is concern for liver mass consider liver protocol MRI. No hydronephrosis. Ct Chest Abdomen Pelvis W Contrast    Result Date: 6/18/2019  EXAMINATION: CT OF THE CHEST, ABDOMEN, AND PELVIS WITH CONTRAST 6/18/2019 9:14 pm TECHNIQUE: CT of the chest, abdomen and pelvis was performed with the administration of intravenous contrast. Multiplanar reformatted images are provided for review.  Dose modulation, iterative reconstruction, and/or weight based adjustment of the mA/kV was utilized to reduce the radiation dose to as low as reasonably achievable. COMPARISON: 06/09/2019 and 08/13/2016 HISTORY: ORDERING SYSTEM PROVIDED HISTORY: fall TECHNOLOGIST PROVIDED HISTORY: Additional Contrast?->None Ordering Physician Provided Reason for Exam: fall Acuity: Acute Type of Exam: Initial FINDINGS: Chest: Mediastinum: Coronary artery calcifications are a marker of atherosclerosis. There are no enlarged thoracic lymph nodes. Calcified granulomatous disease is noted. Small esophageal varices are noted with a trace amount of fluid in the middle mediastinum. Lungs/pleura: The airway is patent. There is no pneumothorax there are small right and trace left pleural effusions with atelectasis. Within the right lower lobe, there is an enlarging oblong 1.0 x 1.8 cm solid pulmonary nodule, previously measuring 1.0 x 1.3 cm. .  An adjacent pulmonary artery is noted and may represent a pulmonary arteriovenous malformation. Soft Tissues/Bones: Degenerative changes involve the thoracic spine. The bones are demineralized. Abdomen/Pelvis: Organs: The spleen, adrenal glands and kidneys are unremarkable. Status post cholecystectomy. There is bilateral cortical renal scarring. The liver is nodular in contour consistent with cirrhosis. There small gastric varices. Within the body of the pancreas, there is a 0.9 x 0.9 cm cystic lesion. GI/Bowel: There is no bowel obstruction. Suture material surrounds the 2nd portion of the duodenum. The appendix is not visualized. Pelvis: Status post hysterectomy. The bladder is not distended and cannot be fully evaluated. Peritoneum/Retroperitoneum: There is no adenopathy. A small amount pelvic ascites and moderate amount of mesenteric edema are noted. Moderate atherosclerosis involves the abdominal aorta and major branch vessels. Bones/Soft Tissues: Degenerative changes involve the lumbar spine. 1. No acute traumatic injury. 2. 1.8 cm enlarging solid right lower lobe pulmonary nodule. Recommend PET-CT for further evaluation. 3. Cirrhosis with stigmata of portal venous hypertension. 4. Small right and trace left pleural effusions. 5. 9 mm pancreatic cystic lesion. Recommend MRI of the abdomen with without contrast using pancreatic mass protocol and MRCP in 2 years. 6. Small pelvic ascites. Xr Hip 3-4 Vw W Pelvis Bilateral    Result Date: 6/9/2019  EXAMINATION: ONE XRAY VIEW OF THE PELVIS AND TWO XRAY VIEWS OF EACH OF THE BILATERAL HIPS 6/9/2019 3:24 pm COMPARISON: None. HISTORY: ORDERING SYSTEM PROVIDED HISTORY: bilateral pain TECHNOLOGIST PROVIDED HISTORY: Reason for exam:->bilateral pain Ordering Physician Provided Reason for Exam: recent fall, L side pain Acuity: Acute Type of Exam: Initial FINDINGS: Two views were obtained of the hips. No gross fracture. No dislocation. Mild sclerosis and spurring of the acetabula, in keeping with degenerative change. Pelvic phleboliths. No gross fracture. Xr Abdomen (2 Views)    Result Date: 6/11/2019  EXAMINATION: TWO XRAY VIEWS OF THE ABDOMEN 6/11/2019 5:00 pm COMPARISON: Portable chest 06/09/2019. Abdominal radiograph 08/15/2016. HISTORY: ORDERING SYSTEM PROVIDED HISTORY: nausea and vomiting TECHNOLOGIST PROVIDED HISTORY: Reason for exam:->nausea and vomiting Ordering Physician Provided Reason for Exam: nausea and vomiting Acuity: Unknown Type of Exam: Unknown FINDINGS: Low lung volumes. Patchy bilateral airspace disease in the visualized lungs. Right costophrenic angle blunting consistent with small pleural effusion. Gas is visible in normal caliber colon. There is a lack of small bowel gas. No suspicious abdominal calcification is seen. Indeterminate bowel-gas pattern due to lack of small bowel gas. Patchy bilateral airspace disease and small right pleural effusion.        Assessment and Plan:  Patient Active Hospital Problem List:   UTI (urinary tract infection)

## 2019-06-20 NOTE — PROGRESS NOTES
[facility] GI  Gastroenterology Progress Note    Sonal Morrison is a 80 y.o. female patient. Principal Problem:    UTI (urinary tract infection)  Active Problems:    High cholesterol    HTN (hypertension)    Hypothyroid  Resolved Problems:    * No resolved hospital problems. *      SUBJECTIVE:  Had 2 BMs today. No abdominal pain. Tried eating lunch then vomited. ROS:  No fever, chills  No chest pain, palpitations  No SOB, cough  Gastrointestinal ROS: see above    Physical    VITALS:  /80   Pulse 81   Temp 97.8 °F (36.6 °C) (Oral)   Resp 16   Ht 5' 4\" (1.626 m)   Wt 206 lb 14.4 oz (93.8 kg)   SpO2 96%   BMI 35.51 kg/m²   TEMPERATURE:  Current - Temp: 97.8 °F (36.6 °C); Max - Temp  Av.1 °F (36.7 °C)  Min: 97.4 °F (36.3 °C)  Max: 98.7 °F (37.1 °C)    NAD  Regular rate   Lungs CTA Bilaterally  Abdomen soft, ND, NT,  Bowel sounds normal.    Data    Data Review:    Recent Labs     19  0622   WBC 5.8 4.1   HGB 13.3 11.8*   HCT 39.4 34.8*   .4* 100.8*   * 107*     Recent Labs     19  0622    143   K 4.1 4.1    110   CO2 19* 23   BUN 19 17   CREATININE 1.2 1.2     Recent Labs     19  0622   AST 39* 30   ALT 25 22   BILITOT 1.0 0.8   ALKPHOS 133* 110     No results for input(s): LIPASE, AMYLASE in the last 72 hours. Recent Labs     19  0622 19  0709   PROTIME 19.6* 20.3* 20.0*   INR 1.72* 1.78* 1.75*     No results for input(s): PTT in the last 72 hours. ASSESSMENT :  Elevated ammonia, hepatic encephalopathy - NH3 elevated at 79. She has expressive aphasia s/p CVA but per daughter pt was confused day of admission but at baseline since. reports compliance with xifaxan. Takes lactulose BID and only has 1-2 BMs daily rather than 3 as previously recommended.  suspect UTI precipitated the HE. Cirrhosis - d/t KRAUS. Followed by Dr Jaxon Barboza. Minimal pelvic ascites on CT. No GI bleeding.  H/o hepatic encephalopathy.   f-actin was weakly positive in the past.   Nausea and vomiting - began today. CT 6/18 nonobstructive. N/V could be related to UTI and antibiotics. H/o falls - may be due to UTI this time. PT/OT eval.   UTI  Pancreatic cystic lesion on CT - f/u MRI in 2 years per radiology recs. PLAN :  - tx uti per primary team  - antiemetics  - lactulose QID and titrate for goal of 3-4 stools daily   - xifaxan 550 mg BID  - low sodium diet  - PT/OT eval     Will sign off. Please call if questions. Discussed with Dr. Dorene Maya, [address]    I have personally performed a face to face diagnostic evaluation on this patient. I have interviewed and examined the patient and I agree with the findings and recommended plan of care. In summary, my findings and plan are the following: mentating baseline, abd soft, some nausea/one episode emesis but trasient and in d/w nurse 7 pm tolearted po later in day, suspect confusion wsa from uti, now improved, rec tx uti, titrate lactulose, cont xifixan, apprecitate pt/ot eval. otherwis will sign off, I can see back in office 2 months. disucssed with pt and daugther.        Henry Baumgarten, MD  [address]

## 2019-06-20 NOTE — PLAN OF CARE
Problem: Falls - Risk of:  Goal: Will remain free from falls  Description  Will remain free from falls  6/20/2019 0015 by Jim Willoughby RN  Outcome: Ongoing  Note:   No falls thus far on this shift. Bed locked and in low position. Call light within reach. Pt encouraged to call out to voice any needs. Bedside table in reach. 6/19/2019 1223 by Angélica Kapoor RN  Note:   No falls noted. Bed check on & in place. Bed in lowest position, wheels are locked. Reminded patient to use call light  for assistance. Patient verbalized understanding of instructions. Call light & personal items w/ in reach. Will continue to monitor. Goal: Absence of physical injury  Description  Absence of physical injury  Outcome: Ongoing     Problem: Pain:  Goal: Pain level will decrease  Description  Pain level will decrease  6/20/2019 0015 by Jim Willoughby RN  Outcome: Ongoing  6/19/2019 1223 by Angélica Kapoor RN  Note:   Pt denies pain. To monitor. Goal: Control of acute pain  Description  Control of acute pain  Outcome: Ongoing  Goal: Control of chronic pain  Description  Control of chronic pain  Outcome: Ongoing     Problem: Risk for Impaired Skin Integrity  Goal: Tissue integrity - skin and mucous membranes  Description  Structural intactness and normal physiological function of skin and  mucous membranes. 6/20/2019 0015 by Jim Willoughby RN  Outcome: Ongoing  Note:   Pt is able to turn self. Pt is being reminded to reposition at least every 2 hours to prevent skin breakdown. Pt verbalizes understanding. No new skin breakdown this shift. 6/19/2019 1223 by Angélica Kapoor RN  Note:   Turn and reposition.   Skin intact

## 2019-06-20 NOTE — PROGRESS NOTES
Good  Sitting - Dynamic: Good;-  Standing - Static: Good  Standing - Dynamic: Good;-        Plan   Plan  Times per week: 3-5x  Times per day: Daily  Current Treatment Recommendations: Strengthening, Balance Training, Transfer Training, Endurance Training, Gait Training, Functional Mobility Training, ADL/Self-care Training, Cognitive/Perceptual Training, Safety Education & Training  Safety Devices  Type of devices: All fall risk precautions in place, Call light within reach, Chair alarm in place, Gait belt, Left in chair, Patient at risk for falls, Nurse notified  Restraints  Initially in place: No    G-Code       OutComes Score                                                  AM-PAC Score  AM-PAC Inpatient Mobility Raw Score : 15 (06/20/19 1047)  AM-PAC Inpatient T-Scale Score : 39.45 (06/20/19 1047)  Mobility Inpatient CMS 0-100% Score: 57.7 (06/20/19 1047)  Mobility Inpatient CMS G-Code Modifier : CK (06/20/19 1047)          Goals  Short term goals  Time Frame for Short term goals: To be met at time of discharge  Short term goal 1: Pt will be able to ambulate 50ft with LRAD with CGA  Short term goal 2: Pt will be able to complete all bed mobility with supervision  Short term goal 3: Pt will be able to complete all functional transfers with CGA       Therapy Time   Individual Concurrent Group Co-treatment   Time In 0941         Time Out 1020         Minutes 39         Timed Code Treatment Minutes: 24 Minutes     Jesus Newman, ELA  PT providing direct supervision during session and assisting in making skilled judgements throughout session.   Jerman Mondragon, PT, DPT, [id number]  Jerman Mondragon, PT

## 2019-06-21 NOTE — PLAN OF CARE
Problem: Falls - Risk of:  Goal: Will remain free from falls  Description  Will remain free from falls  Outcome: Ongoing  Goal: Absence of physical injury  Description  Absence of physical injury  Outcome: Ongoing     Problem: Pain:  Description  Pain management should include both nonpharmacologic and pharmacologic interventions. Goal: Pain level will decrease  Description  Pain level will decrease  Outcome: Ongoing  Goal: Control of acute pain  Description  Control of acute pain  Outcome: Ongoing  Goal: Control of chronic pain  Description  Control of chronic pain  Outcome: Ongoing     Problem: Risk for Impaired Skin Integrity  Goal: Tissue integrity - skin and mucous membranes  Description  Structural intactness and normal physiological function of skin and  mucous membranes.   Outcome: Ongoing     Problem: Urinary Elimination:  Goal: Signs and symptoms of infection will decrease  Description  Signs and symptoms of infection will decrease  Outcome: Ongoing  Goal: Ability to reestablish a normal urinary elimination pattern will improve - after catheter removal  Description  Ability to reestablish a normal urinary elimination pattern will improve  Outcome: Ongoing  Goal: Complications related to the disease process, condition or treatment will be avoided or minimized  Description  Complications related to the disease process, condition or treatment will be avoided or minimized  Outcome: Ongoing

## 2019-06-21 NOTE — PROGRESS NOTES
Assessment complete. VSS. Scheduled medications given. Patient complaining of nausea, PRN zofran given. Patient resting in bed. Respirations even and easy. Call light in reach. Fall precautions in place, bed alarm on. No other needs expressed at this time. Will continue to monitor.

## 2019-06-21 NOTE — CARE COORDINATION
DISCHARGE SUMMARY     DATE OF DISCHARGE: 06/21/19    DISCHARGE DESTINATION: [facility]    Level of Care: Long Term    Report Number: [phone] for Supervisor    Fax Number:  [phone]    Precert Obtained: N/A    Hens Completed: N/A    PASARR: N/A    Notified: RN, Family and Facility/Agency-bedside Rn informed pt and family-Kimberley in admissions aware of pt's return and to inform Supervisor.     Prescriptions Faxed:N/A    TRANSPORTATION: [place] [id number] Name: [address] [place] [address] up Time: 1230pm    Phone Number: [phone]    Electronically signed by BIJAN Freeman on 6/21/2019 at 10:16 AM

## 2019-06-21 NOTE — DISCHARGE SUMMARY
[facility] -- Physician Discharge Summary     David Aguilera  1934  MRN: [id number]    Admit Date: 6/18/2019  Discharge Date: No discharge date for patient encounter. Attending MD: Eleuterio Mayes MD  Discharging MD: Eleuterio Mayes MD  PCP: Andrew Louise  [facility] [id number] [phone]    Admission Diagnosis: UTI (urinary tract infection) [N39.0]  UTI (urinary tract infection) [N39.0]  DISCHARGE DIAGNOSIS: UTI, hepatic encephalopathy    Full Hospital Problem List:  Active Hospital Problems    Diagnosis Date Noted    UTI (urinary tract infection) [N39.0] 06/18/2019    HTN (hypertension) [I10] 06/09/2019    High cholesterol [E78.00] 06/09/2019    Hypothyroid [E03.9] 06/09/2019           Hospital Course:  80 y. o. female with past medical history of anemia, hyperlipidemia, hypertension, thyroid disease and previous CVA who presents the ED with complaint of a fall.  Apparently was found on the ground wedged between the bed.  EMS states they were unsure how long patient was on the ground but upon discussion with family at bedside family was in the room just left him when they returned a few minutes later she was on the ground.  Fall was not witnessed. Kenneth Aguilar is demented and very difficult historian at this time.  Is on Coumadin and Plavix.  Patient was complaining of abdominal pain to nurse but upon my exam denies any abdominal pain.  Patient apparently told family that she was complaining of a headache but denies headache on my exam. Kenneth Aguilar states she does have pain to her neck though and also to her right forearm and left hip. Maribel Cm is a very difficult historian this time and demented.  Family states has been having multiple falls over the past couple months.     eval in ER shows likely UTI. Also with elevated ammonia levels (pt with known hx of hepatic encephalopathy)     CT Head is reviewed       Impression:         No acute intracranial abnormality. Admitted, placed on empiric iv rocephin  Urine cultures come back    Susceptibility     Escherichia coli (1)     Antibiotic Interpretation CALLIE Status    amoxicillin-clavulanate Resistant >16/8 mcg/mL     ampicillin Resistant >16 mcg/mL     ceFAZolin Resistant 8 mcg/mL      NOTE: Cefazolin should only be used for uncomplicated UTI        for E.coli or Klebsiella pneumoniae. cefepime Sensitive <=1 mcg/mL     cefTRIAXone Sensitive <=1 mcg/mL     ciprofloxacin Sensitive <=0.5 mcg/mL     gentamicin Sensitive <=2 mcg/mL     levofloxacin Sensitive <=1 mcg/mL     meropenem Sensitive <=0.25 mcg/mL     nitrofurantoin Sensitive 32 mcg/mL     piperacillin-tazobactam Sensitive 4/4 mcg/mL     tetracycline Sensitive <=2 mcg/mL     trimethoprim-sulfamethoxazole Sensitive <=0.5/9.5 mcg/mL       Abx changed to po cipro based on cx results    Pt also has a flare up of her hepatic encephalopathy  Ammonia noted to be high  GI consulted - per their eval, it is felt that UTI is likely the cause of this  Pt is placed on xifaxan. Continued on her lactulose  Advised to continue lactulose for 1-2 loose BM per day  At time of d/c, pt is more alert  According to daughter, this is closer to her baseline    Pt is to transfer back to SNF    Consults made during Hospitalization:  [id number] [facility]    Treatment team at time of Discharge: Treatment Team: Attending Provider: Mariana Oviedo MD; Respiratory Therapist (Day): Alisson Rizo RCP; Registered Nurse: Jorge Andre RN    Imaging Results:  Xr Radius Ulna Right (2 Views)    Result Date: 6/18/2019  EXAMINATION: 2 XRAY VIEWS OF THE RIGHT FOREARM 6/18/2019 8:49 pm COMPARISON: None. HISTORY: ORDERING SYSTEM PROVIDED HISTORY: inj TECHNOLOGIST PROVIDED HISTORY: Reason for exam:->inj Ordering Physician Provided Reason for Exam: Fall (Pt in from [facility] found on floor wedged under bed, unsure of when see last per squad [facility] didn't know. Pt alert and orientated.  Pt with dementia inable to tell RN when she feel. Pt complaining of abdominal pain. Pt on coumedin and plavix.) Acuity: Unknown Type of Exam: Unknown FINDINGS: The bones appear mildly demineralized and show degenerative changes. No acute fracture or dislocation is identified. No acute bony abnormality. Xr Femur Left (min 2 Views)    Result Date: 6/18/2019  EXAMINATION: 5 XRAY VIEWS OF THE LEFT FEMUR 6/18/2019 8:49 pm COMPARISON: None. HISTORY: ORDERING SYSTEM PROVIDED HISTORY: inj TECHNOLOGIST PROVIDED HISTORY: Reason for exam:->inj Ordering Physician Provided Reason for Exam: Fall (Pt in from [facility] found on floor wedged under bed, unsure of when see last per squad [facility] didn't know. Pt alert and orientated. Pt with dementia inable to tell RN when she feel. Pt complaining of abdominal pain. Pt on coumedin and plavix.) Acuity: Unknown Type of Exam: Unknown FINDINGS: There is no acute fracture. The bones are demineralized. There are no bony destructive lesions. Degenerative changes involve the knee and hip. 1. No acute abnormality. Ct Head Wo Contrast    Result Date: 6/18/2019  EXAMINATION: CT OF THE HEAD WITHOUT CONTRAST  6/18/2019 9:14 pm TECHNIQUE: CT of the head was performed without the administration of intravenous contrast. Dose modulation, iterative reconstruction, and/or weight based adjustment of the mA/kV was utilized to reduce the radiation dose to as low as reasonably achievable. COMPARISON: 06/10/2018 HISTORY: ORDERING SYSTEM PROVIDED HISTORY: fall TECHNOLOGIST PROVIDED HISTORY: Has a \"code stroke\" or \"stroke alert\" been called? ->No Ordering Physician Provided Reason for Exam: fall Acuity: Acute Type of Exam: Initial Is history of hypertension FINDINGS: Motion artifact degrades many of the images. BRAIN/VENTRICLES: There is no acute intracranial hemorrhage, mass effect or midline shift. No abnormal extra-axial fluid collection.   The gray-white differentiation is maintained without evidence of an acute infarct. There is no evidence of hydrocephalus. Again identified is central atrophy and chronic white matter disease. Encephalomalacia within the left frontal lobe is compatible with remote insult, likely infarct, unchanged. ORBITS: The visualized portion of the orbits demonstrate no acute abnormality. SINUSES: Left sphenoid sinus disease is decreased; there is a small amount of frothy material remaining in the left sphenoid sinus. SOFT TISSUES/SKULL:  No acute abnormality of the visualized skull or soft tissues. No acute intracranial abnormality. Ct Head Wo Contrast    Result Date: 6/10/2019  EXAMINATION: CT OF THE HEAD WITHOUT CONTRAST  6/10/2019 5:27 pm TECHNIQUE: CT of the head was performed without the administration of intravenous contrast. Dose modulation, iterative reconstruction, and/or weight based adjustment of the mA/kV was utilized to reduce the radiation dose to as low as reasonably achievable. COMPARISON: 06/09/2019 and 05/31/2019 HISTORY: ORDERING SYSTEM PROVIDED HISTORY: ABNORMAL GAIT (ATAXIA) TECHNOLOGIST PROVIDED HISTORY: Has a \"code stroke\" or \"stroke alert\" been called? ->Yes Ordering Physician Provided Reason for Exam: Abnormal gait (ataxia); Stroke Acuity: Unknown Type of Exam: Unknown FINDINGS: BRAIN/VENTRICLES: There is generalized atrophy. There is unchanged left frontal lobe encephalomalacia consistent with a remote infarct. There is patchy periventricular and subcortical white matter low attenuation that is nonspecific but most consistent with chronic small vessel ischemia. There are few bilateral basal ganglia calcifications. No evidence of acute hemorrhage, mass or extra-axial fluid collection. ORBITS: The visualized portion of the orbits demonstrate no acute abnormality. SINUSES: There is unchanged disease in the left sphenoid locule. Disease has increased from 05/31/2019 paranasal sinuses are otherwise clear.   Mastoids are poorly aerated on a developmental basis. SOFT TISSUES/SKULL:  No acute abnormality of the visualized skull or soft tissues. Generalized atrophy and chronic ischemic changes as above including remote left frontal infarct. No acute intracranial abnormality. Sphenoid sinus disease has increased from 05/31/2019. Findings were discussed with MICHELLE MCNALLY at 6:16 pm on 6/10/2019. Ct Head Wo Contrast    Result Date: 6/9/2019  EXAMINATION: CT OF THE HEAD WITHOUT CONTRAST  6/9/2019 3:31 pm TECHNIQUE: CT of the head was performed without the administration of intravenous contrast. Dose modulation, iterative reconstruction, and/or weight based adjustment of the mA/kV was utilized to reduce the radiation dose to as low as reasonably achievable. COMPARISON: CT 05/31/2019 HISTORY: ORDERING SYSTEM PROVIDED HISTORY: fall, head injury TECHNOLOGIST PROVIDED HISTORY: Has a \"code stroke\" or \"stroke alert\" been called? ->No Ordering Physician Provided Reason for Exam: Fall (Pt. comes in by [facility] home after she fell. Daughter was with pt. when she fell. Pt. felt dizzy prior to falling, daughter states she went backwards and hit her head on her bed. Daughter also concerned becuase left hip is bothering her and had not had it looked at.) Acuity: Acute Type of Exam: Initial Mechanism of Injury: fall FINDINGS: BRAIN/VENTRICLES: There is no acute intracranial hemorrhage, mass effect or midline shift. No abnormal extra-axial fluid collection. The gray-white differentiation is maintained without evidence of an acute infarct. There is no evidence of hydrocephalus. Encephalomalacia in the left frontal lobe is unchanged. Mild atrophic changes. Moderate periventricular white matter hypoattenuation, likely due to small vessel ischemic disease. Remote lacunar infarct in right basal ganglia. ORBITS: The visualized portion of the orbits demonstrate no acute abnormality. SINUSES: Mild mucosal thickening in sphenoid sinus.  SOFT TISSUES/SKULL:  No acute abnormality of the visualized skull or soft tissues. No acute intracranial abnormality. Ct Head Wo Contrast    Result Date: 5/31/2019  EXAMINATION: CT OF THE HEAD WITHOUT CONTRAST  5/31/2019 3:27 pm TECHNIQUE: CT of the head was performed without the administration of intravenous contrast. Dose modulation, iterative reconstruction, and/or weight based adjustment of the mA/kV was utilized to reduce the radiation dose to as low as reasonably achievable. COMPARISON: October 15, 2017 HISTORY: ORDERING SYSTEM PROVIDED HISTORY: head injury TECHNOLOGIST PROVIDED HISTORY: Has a \"code stroke\" or \"stroke alert\" been called? ->No Ordering Physician Provided Reason for Exam: Fall (pt brought in by [facility] from [facility]. Pt c/o headache after a witness mechanical fall this am at appox 0830. Did hit forehead. Denies LOC. Pt was monitored at facility and given tyelonel for pain. Pt is on blood thinners . At appox 1400 [facility] called 911 due to pt reporting a headache. Pt hx of Dementia. Squad and family reports pt at baseline. A&O to self.) Acuity: Acute Type of Exam: Initial Mechanism of Injury: fall FINDINGS: BRAIN/VENTRICLES: There is no acute intracranial hemorrhage, mass effect or midline shift. No abnormal extra-axial fluid collection. The gray-white differentiation is maintained without evidence of an acute infarct. There is no evidence of hydrocephalus. Encephalomalacia related to remote infarct of the left frontal lobe again noted. ORBITS: The visualized portion of the orbits demonstrate no acute abnormality. SINUSES: The visualized paranasal sinuses and mastoid air cells demonstrate no acute abnormality. SOFT TISSUES/SKULL:  No acute abnormality of the visualized skull or soft tissues. No acute intracranial abnormality.      Ct Cervical Spine Wo Contrast    Result Date: 6/18/2019  EXAMINATION: CT OF THE CERVICAL SPINE WITHOUT CONTRAST 6/18/2019 9:15 pm TECHNIQUE: CT of the cervical spine was performed as reasonably achievable. COMPARISON: 05/31/2019 HISTORY: ORDERING SYSTEM PROVIDED HISTORY: fall, head injury TECHNOLOGIST PROVIDED HISTORY: If patient is on cardiac monitor and/or pulse ox, they may be taken off cardiac monitor and pulse ox, left on O2 if currently on. All monitors reattached when patient returns to room. Ordering Physician Provided Reason for Exam: Fall (Pt. comes in by [facility] home after she fell. Daughter was with pt. when she fell. Pt. felt dizzy prior to falling, daughter states she went backwards and hit her head on her bed. Daughter also concerned becuase left hip is bothering her and had not had it looked at.) Acuity: Acute Type of Exam: Initial Mechanism of Injury: fall FINDINGS: BONES/ALIGNMENT: Multiple contiguous axial images were obtained of the cervical spine. Sagittal and coronal reformats were obtained. Multilevel disc space narrowing is seen with endplate osteophytosis, compatible with degenerative disc disease. Vacuum phenomenon at C4-C5 and C5-C6. No spondylolisthesis. Moderate spurring is seen at C1-C2, similar to prior. No marked vertebral body height loss. No gross fracture is seen. DEGENERATIVE CHANGES: As above SOFT TISSUES: Bilateral carotid artery calcification. Mucosal thickening of sphenoid sinus, incompletely imaged. Multilevel degenerative disc disease, without spondylolisthesis or gross fracture. Ct Cervical Spine Wo Contrast    Result Date: 5/31/2019  EXAMINATION: CT OF THE CERVICAL SPINE WITHOUT CONTRAST 5/31/2019 3:27 pm TECHNIQUE: CT of the cervical spine was performed without the administration of intravenous contrast. Multiplanar reformatted images are provided for review. Dose modulation, iterative reconstruction, and/or weight based adjustment of the mA/kV was utilized to reduce the radiation dose to as low as reasonably achievable. COMPARISON: None.  HISTORY: ORDERING SYSTEM PROVIDED HISTORY: fall, injury TECHNOLOGIST PROVIDED HISTORY: Ordering Physician Provided Reason for Exam: Fall (pt brought in by [facility] from [facility]. Pt c/o headache after a witness mechanical fall this am at appox 0830. Did hit forehead. Denies LOC. Pt was monitored at facility and given tyelonel for pain. Pt is on blood thinners . At appox 1400 NH called 911 due to pt reporting a headache. Pt hx of Dementia. Squad and family reports pt at baseline. A&O to self.) Acuity: Acute Type of Exam: Initial Mechanism of Injury: fall FINDINGS: BONES/ALIGNMENT: No evidence of fracture or subluxation DEGENERATIVE CHANGES: Degenerative disc disease C4-C5 through C6-C7. Facet osteoarthritis C2-C3 on the right, and C3-C4 and C7-T1 on the left. SOFT TISSUES: There is no prevertebral soft tissue swelling. No acute abnormality of the cervical spine. Ct Lumbar Spine Wo Contrast    Result Date: 6/9/2019  EXAMINATION: CT OF THE LUMBAR SPINE WITHOUT CONTRAST,  6/9/2019 TECHNIQUE: CT of the lumbar spine was performed without the administration of intravenous contrast. Multiplanar reformatted images are provided for review. Dose modulation, iterative reconstruction, and/or weight based adjustment of the mA/kV was utilized to reduce the radiation dose to as low as reasonably achievable. COMPARISON: None HISTORY: ORDERING SYSTEM PROVIDED HISTORY: Fall, pain TECHNOLOGIST PROVIDED HISTORY: Reason for exam:->Fall, pain Ordering Physician Provided Reason for Exam: Fall (Pt. comes in by [facility] after she fell. Daughter was with pt. when she fell. Pt. felt dizzy prior to falling, daughter states she went backwards and hit her head on her bed. Daughter also concerned because left hip is bothering her and had not had it looked at.) Acuity: Acute Type of Exam: Initial Mechanism of Injury: Fall FINDINGS: BONES/ALIGNMENT: No acute fracture of the lumbar spine. Anterolisthesis of L4 on L5 measures 6 mm.  DEGENERATIVE CHANGES: Severe degenerative disc disease is identified at L2-3. Moderate degenerative disc disease is identified at L3-4 and L5-S1. Findings are associated with endplate sclerosis and spurring. Severe facet arthropathy is seen at L3-4, L4-5, and L5-S1. SOFT TISSUES/RETROPERITONEUM: No paraspinal mass is seen. Other: Severe atherosclerosis. 1. No acute osseous abnormality of the lumbar spine. 2. Multilevel degenerative changes as described. Us Renal Complete    Result Date: 6/10/2019  EXAMINATION: RIGHT UPPER QUADRANT ULTRASOUND 6/10/2019 2:18 pm COMPARISON: CT 08/13/2016 HISTORY: ORDERING SYSTEM PROVIDED HISTORY: cirrhosis, eval for ascites, eval for liver mass TECHNOLOGIST PROVIDED HISTORY: Reason for exam:->cirrhosis, eval for ascites, eval for liver mass Ordering Physician Provided Reason for Exam: cirrhosis/Eval for liver mass and ascites Acuity: Chronic Type of Exam: Initial Additional signs and symptoms: GB removed 1970s Relevant Medical/Surgical History: na FINDINGS: LIVER:  The liver demonstrates normal echogenicity without evidence of intrahepatic biliary ductal dilatation. BILIARY SYSTEM:  Prior cholecystectomy. Common bile duct is within normal limits measuring 6 mm. KIDNEYS: Right kidney measures 8.3 cm. Left kidney measures 9.5 cm. No hydronephrosis. Focal lesion or calculus. BLADDER: Unremarkable appearance of the bladder. PANCREAS:  Visualized portions of the pancreas are unremarkable. OTHER: No evidence of right upper quadrant ascites. Unremarkable right upper quadrant ultrasound. If there is concern for liver mass consider liver protocol MRI. No hydronephrosis.      Xr Chest Portable    Result Date: 6/9/2019  EXAMINATION: ONE XRAY VIEW OF THE CHEST 6/9/2019 3:24 pm COMPARISON: None HISTORY: ORDERING SYSTEM PROVIDED HISTORY: Chest Discomfort TECHNOLOGIST PROVIDED HISTORY: Reason for exam:->Chest Discomfort Ordering Physician Provided Reason for Exam: recent fall Acuity: Acute Relevant Medical/Surgical History: dementia achievable. COMPARISON: 06/09/2019 and 08/13/2016 HISTORY: ORDERING SYSTEM PROVIDED HISTORY: fall TECHNOLOGIST PROVIDED HISTORY: Additional Contrast?->None Ordering Physician Provided Reason for Exam: fall Acuity: Acute Type of Exam: Initial FINDINGS: Chest: Mediastinum: Coronary artery calcifications are a marker of atherosclerosis. There are no enlarged thoracic lymph nodes. Calcified granulomatous disease is noted. Small esophageal varices are noted with a trace amount of fluid in the middle mediastinum. Lungs/pleura: The airway is patent. There is no pneumothorax there are small right and trace left pleural effusions with atelectasis. Within the right lower lobe, there is an enlarging oblong 1.0 x 1.8 cm solid pulmonary nodule, previously measuring 1.0 x 1.3 cm. .  An adjacent pulmonary artery is noted and may represent a pulmonary arteriovenous malformation. Soft Tissues/Bones: Degenerative changes involve the thoracic spine. The bones are demineralized. Abdomen/Pelvis: Organs: The spleen, adrenal glands and kidneys are unremarkable. Status post cholecystectomy. There is bilateral cortical renal scarring. The liver is nodular in contour consistent with cirrhosis. There small gastric varices. Within the body of the pancreas, there is a 0.9 x 0.9 cm cystic lesion. GI/Bowel: There is no bowel obstruction. Suture material surrounds the 2nd portion of the duodenum. The appendix is not visualized. Pelvis: Status post hysterectomy. The bladder is not distended and cannot be fully evaluated. Peritoneum/Retroperitoneum: There is no adenopathy. A small amount pelvic ascites and moderate amount of mesenteric edema are noted. Moderate atherosclerosis involves the abdominal aorta and major branch vessels. Bones/Soft Tissues: Degenerative changes involve the lumbar spine. 1. No acute traumatic injury. 2. 1.8 cm enlarging solid right lower lobe pulmonary nodule. Recommend PET-CT for further evaluation.  3. Cirrhosis with stigmata of portal venous hypertension. 4. Small right and trace left pleural effusions. 5. 9 mm pancreatic cystic lesion. Recommend MRI of the abdomen with without contrast using pancreatic mass protocol and MRCP in 2 years. 6. Small pelvic ascites. Xr Hip 3-4 Vw W Pelvis Bilateral    Result Date: 6/9/2019  EXAMINATION: ONE XRAY VIEW OF THE PELVIS AND TWO XRAY VIEWS OF EACH OF THE BILATERAL HIPS 6/9/2019 3:24 pm COMPARISON: None. HISTORY: ORDERING SYSTEM PROVIDED HISTORY: bilateral pain TECHNOLOGIST PROVIDED HISTORY: Reason for exam:->bilateral pain Ordering Physician Provided Reason for Exam: recent fall, L side pain Acuity: Acute Type of Exam: Initial FINDINGS: Two views were obtained of the hips. No gross fracture. No dislocation. Mild sclerosis and spurring of the acetabula, in keeping with degenerative change. Pelvic phleboliths. No gross fracture. Xr Abdomen (2 Views)    Result Date: 6/11/2019  EXAMINATION: TWO XRAY VIEWS OF THE ABDOMEN 6/11/2019 5:00 pm COMPARISON: Portable chest 06/09/2019. Abdominal radiograph 08/15/2016. HISTORY: ORDERING SYSTEM PROVIDED HISTORY: nausea and vomiting TECHNOLOGIST PROVIDED HISTORY: Reason for exam:->nausea and vomiting Ordering Physician Provided Reason for Exam: nausea and vomiting Acuity: Unknown Type of Exam: Unknown FINDINGS: Low lung volumes. Patchy bilateral airspace disease in the visualized lungs. Right costophrenic angle blunting consistent with small pleural effusion. Gas is visible in normal caliber colon. There is a lack of small bowel gas. No suspicious abdominal calcification is seen. Indeterminate bowel-gas pattern due to lack of small bowel gas. Patchy bilateral airspace disease and small right pleural effusion.          Discharge Exam:  /68   Pulse 70   Temp 97.9 °F (36.6 °C) (Oral)   Resp 16   Ht 5' 4\" (1.626 m)   Wt 206 lb 14.4 oz (93.8 kg)   SpO2 92%   BMI 35.51 kg/m²   General appearance: alert, appears stated age and cooperative  Head: Normocephalic, without obvious abnormality, atraumatic  Lungs: clear to auscultation bilaterally  Heart: regular rate and rhythm, S1, S2 normal, no murmur, click, rub or gallop  Abdomen: soft, non-tender; bowel sounds normal; no masses,  no organomegaly  Extremities: extremities normal, atraumatic, no cyanosis or edema    Disposition: SNF    Condition: stable    Discharge Medications:   Guerda Logan   Home Medication Instructions [id number]    Printed on:06/21/19 [id number]   Medication Information                      acetaminophen (TYLENOL) 325 MG tablet  Take 650 mg by mouth every 6 hours as needed for Pain             amLODIPine (NORVASC) 10 MG tablet  Take 1 tablet by mouth daily             atorvastatin (LIPITOR) 80 MG tablet  Take 80 mg by mouth daily             ciprofloxacin (CIPRO) 500 MG tablet  Take 1 tablet by mouth every 12 hours for 10 days             clopidogrel (PLAVIX) 75 MG tablet  Take 75 mg by mouth daily             famotidine (PEPCID) 20 MG tablet  Take 20 mg by mouth daily             ferrous gluconate (FERGON) 324 (38 FE) MG tablet  Take 324 mg by mouth daily (with breakfast)             gabapentin (NEURONTIN) 300 MG capsule  Take 300 mg by mouth 4 times daily             Glucosamine 500 MG CAPS  Take 500 mg by mouth daily             hydrocortisone 1 % cream  Apply topically 2 times daily as needed Apply topically 2 times daily.               lactulose (CHRONULAC) 10 GM/15ML solution  Take 30 mLs by mouth 2 times daily             levothyroxine (SYNTHROID) 125 MCG tablet  Take 1 tablet by mouth Daily             meclizine (ANTIVERT) 12.5 MG tablet  Take 12.5 mg by mouth 3 times daily as needed             menthol-cetylpyridinium (CEPACOL) 3 MG lozenge  Take 1 lozenge by mouth every 2 hours as needed for Sore Throat             nystatin (MYCOSTATIN) POWD powder  Apply topically every 6 hours as needed For redness or rash             potassium chloride (KLOR-CON) 10 MEQ extended release tablet  Take 10 mEq by mouth daily             rifaximin (XIFAXAN) 550 MG tablet  Take 1 tablet by mouth 2 times daily             vitamin D 1000 units CAPS  Take 1 capsule by mouth daily             warfarin (COUMADIN) 6 MG tablet  Take 6 mg by mouth every other day             warfarin (COUMADIN) 6 MG tablet  Take 6.5 mg by mouth every other day                 Allergies:  No Known Allergies    Follow up Instructions: Follow-up with PCP: Cookie Griggs MD in 2 wk .       Total time spent on day of discharge including face-to-face visit, examination, documentation, counseling, preparation of discharge plans and followup, and discharge medicine reconciliation and presciptions is 34 minutes    Signed:  Kingsley Bassett MD  6/21/2019

## 2019-06-21 NOTE — PROGRESS NOTES
Report called into [facility] to Nurse. PIV removed no complications, transfer report completed with [facility].

## 2019-07-19 PROBLEM — N39.0 UTI (URINARY TRACT INFECTION): Status: RESOLVED | Noted: 2019-01-01 | Resolved: 2019-01-01

## 2019-07-23 PROBLEM — J18.9 PNEUMONIA: Status: ACTIVE | Noted: 2019-01-01

## 2019-07-23 PROBLEM — R55 SYNCOPE: Status: RESOLVED | Noted: 2019-01-01 | Resolved: 2019-01-01

## 2019-07-23 PROBLEM — I69.30 LATE EFFECTS OF CEREBRAL ISCHEMIC STROKE: Status: ACTIVE | Noted: 2019-01-01

## 2019-07-23 PROBLEM — N28.9 ACUTE KIDNEY INSUFFICIENCY: Status: RESOLVED | Noted: 2019-01-01 | Resolved: 2019-01-01

## 2019-07-23 PROBLEM — N17.9 AKI (ACUTE KIDNEY INJURY) (HCC): Status: RESOLVED | Noted: 2019-01-01 | Resolved: 2019-01-01

## 2019-07-23 PROBLEM — N17.9 ARF (ACUTE RENAL FAILURE) (HCC): Status: RESOLVED | Noted: 2019-01-01 | Resolved: 2019-01-01

## 2019-07-23 NOTE — ED NOTES
Pt medicated per orders. Updated on plan of care. Patient resting comfortably with no signs of distress. Denies any needs at this time. Bed locked and in lowest position with both side rails raised. Call light within reach. Daughter at bedside.      Piter Lim RN  07/23/19 8832
Apply topically 2 times daily.        nystatin (MYCOSTATIN) POWD powder Apply topically every 6 hours as needed For redness or rash      famotidine (PEPCID) 20 MG tablet Take 20 mg by mouth daily      potassium chloride (KLOR-CON) 10 MEQ extended release tablet Take 10 mEq by mouth daily      acetaminophen (TYLENOL) 325 MG tablet Take 650 mg by mouth every 6 hours as needed for Pain      vitamin D 1000 units CAPS Take 1 capsule by mouth daily      rifaximin (XIFAXAN) 550 MG tablet Take 1 tablet by mouth 2 times daily 60 tablet 2    lactulose (CHRONULAC) 10 GM/15ML solution Take 30 mLs by mouth 2 times daily 1 Bottle 1    atorvastatin (LIPITOR) 80 MG tablet Take 80 mg by mouth daily      clopidogrel (PLAVIX) 75 MG tablet Take 75 mg by mouth daily      gabapentin (NEURONTIN) 300 MG capsule Take 300 mg by mouth 4 times daily      meclizine (ANTIVERT) 12.5 MG tablet Take 12.5 mg by mouth 3 times daily as needed      ferrous gluconate (FERGON) 324 (38 FE) MG tablet Take 324 mg by mouth daily (with breakfast)      [DISCONTINUED] warfarin (COUMADIN) 6 MG tablet Take 6.5 mg by mouth every other day      [DISCONTINUED] warfarin (COUMADIN) 6 MG tablet Take 6 mg by mouth every other day

## 2019-07-23 NOTE — ED PROVIDER NOTES
Gastrointestinal: Negative for abdominal pain, diarrhea, nausea and vomiting. Genitourinary: Negative for dysuria. All other systems reviewed and are negative. Positives and Pertinent negatives as per HPI. Except as noted abovein the ROS, all other systems were reviewed and negative. PAST MEDICAL HISTORY     Past Medical History:   Diagnosis Date    Anemia     Hyperlipidemia     Hypertension     Nonalcoholic fatty liver disease 2016    Thyroid disease     Unspecified cerebral artery occlusion with cerebral infarction          SURGICAL HISTORY     Past Surgical History:   Procedure Laterality Date    CHOLECYSTECTOMY      HYSTERECTOMY           CURRENTMEDICATIONS       Previous Medications    ACETAMINOPHEN (TYLENOL) 325 MG TABLET    Take 650 mg by mouth every 6 hours as needed for Pain    AMLODIPINE (NORVASC) 10 MG TABLET    Take 1 tablet by mouth daily    ATORVASTATIN (LIPITOR) 80 MG TABLET    Take 80 mg by mouth daily    CLOPIDOGREL (PLAVIX) 75 MG TABLET    Take 75 mg by mouth daily    FAMOTIDINE (PEPCID) 20 MG TABLET    Take 20 mg by mouth daily    FERROUS GLUCONATE (FERGON) 324 (38 FE) MG TABLET    Take 324 mg by mouth daily (with breakfast)    GABAPENTIN (NEURONTIN) 300 MG CAPSULE    Take 300 mg by mouth 4 times daily    GLUCOSAMINE 500 MG CAPS    Take 500 mg by mouth daily    HYDROCORTISONE 1 % CREAM    Apply topically 2 times daily as needed Apply topically 2 times daily.      LACTULOSE (CHRONULAC) 10 GM/15ML SOLUTION    Take 30 mLs by mouth 2 times daily    LEVOTHYROXINE (SYNTHROID) 125 MCG TABLET    Take 1 tablet by mouth Daily    MECLIZINE (ANTIVERT) 12.5 MG TABLET    Take 12.5 mg by mouth 3 times daily as needed    MENTHOL-CETYLPYRIDINIUM (CEPACOL) 3 MG LOZENGE    Take 1 lozenge by mouth every 2 hours as needed for Sore Throat    NYSTATIN (MYCOSTATIN) POWD POWDER    Apply topically every 6 hours as needed For redness or rash    POTASSIUM CHLORIDE (KLOR-CON) 10 MEQ EXTENDED RELEASE TABLET    Take 10 mEq by mouth daily    RIFAXIMIN (XIFAXAN) 550 MG TABLET    Take 1 tablet by mouth 2 times daily    VITAMIN D 1000 UNITS CAPS    Take 1 capsule by mouth daily    WARFARIN (COUMADIN) 6 MG TABLET    Take 6 mg by mouth every other day    WARFARIN (COUMADIN) 6 MG TABLET    Take 6.5 mg by mouth every other day         ALLERGIES     Patient has no known allergies. FAMILYHISTORY     History reviewed. No pertinent family history.        SOCIAL HISTORY       Social History     Socioeconomic History    Marital status:      Spouse name: None    Number of children: None    Years of education: None    Highest education level: None   Occupational History    None   Social Needs    Financial resource strain: None    Food insecurity:     Worry: None     Inability: None    Transportation needs:     Medical: None     Non-medical: None   Tobacco Use    Smoking status: Never Smoker    Smokeless tobacco: Never Used   Substance and Sexual Activity    Alcohol use: No    Drug use: No    Sexual activity: None   Lifestyle    Physical activity:     Days per week: None     Minutes per session: None    Stress: None   Relationships    Social connections:     Talks on phone: None     Gets together: None     Attends Worship service: None     Active member of club or organization: None     Attends meetings of clubs or organizations: None     Relationship status: None    Intimate partner violence:     Fear of current or ex partner: None     Emotionally abused: None     Physically abused: None     Forced sexual activity: None   Other Topics Concern    None   Social History Narrative    None       SCREENINGS             PHYSICAL EXAM    (up to 7 for level 4, 8 or more for level 5)     ED Triage Vitals [07/23/19 1442]   BP Temp Temp Source Pulse Resp SpO2 Height Weight   137/73 98.6 °F (37 °C) Infrared 87 22 93 % -- 200 lb (90.7 kg)       Physical Exam   Constitutional: She is oriented to person, place, and time. She appears well-developed and well-nourished. HENT:   Head: Normocephalic and atraumatic. Right Ear: External ear normal.   Left Ear: External ear normal.   Eyes: Right eye exhibits no discharge. Left eye exhibits no discharge. Neck: Normal range of motion. Neck supple. No JVD present. Cardiovascular: Normal rate and regular rhythm. Pulmonary/Chest: Effort normal. No respiratory distress. She has decreased breath sounds. She has wheezes. Abdominal: Soft. She exhibits no mass. There is no tenderness. Musculoskeletal: Normal range of motion. Neurological: She is alert and oriented to person, place, and time. Skin: Skin is warm and dry. She is not diaphoretic. No pallor. Psychiatric: She has a normal mood and affect. Her behavior is normal.   Nursing note and vitals reviewed.       DIAGNOSTIC RESULTS   LABS:    Labs Reviewed   CBC WITH AUTO DIFFERENTIAL - Abnormal; Notable for the following components:       Result Value    Platelets 86 (*)     Neutrophils # 8.7 (*)     Lymphocytes # 0.3 (*)     All other components within normal limits    Narrative:     Performed at:  [facility]  [address], [facility] [employer]   Phone [phone]   COMPREHENSIVE METABOLIC PANEL W/ REFLEX TO MG FOR LOW K - Abnormal; Notable for the following components:    Glucose 138 (*)     BUN 38 (*)     CREATININE 1.3 (*)     GFR Non- 39 (*)     GFR African American 47 (*)     Albumin/Globulin Ratio 0.9 (*)     Total Bilirubin 1.9 (*)     ALT 53 (*)     AST 84 (*)     All other components within normal limits    Narrative:     Performed at:  [facility]  [address] [facility] [facility], [facility] [employer]   Phone [phone]   BRAIN NATRIURETIC PEPTIDE - Abnormal; Notable for the following components:    Pro- (*)     All other components within normal limits    Narrative:     Performed at:  [facility] DISCONTINUED MEDICATIONS:  Discontinued Medications    No medications on file              (Please note that portions ofthis note were completed with a voice recognition program.  Efforts were made to edit the dictations but occasionally words are mis-transcribed.)    GONSALO Davidson CNP (electronically signed)           GONSALO Davidson CNP  07/23/19 2546

## 2019-07-23 NOTE — ED PROVIDER NOTES
[facility] Emergency Department      Pt Name: Shukri Douglas  MRN: [id number]  [place] 1934  Date of evaluation: 7/23/2019  Provider: Lynda Salas MD  I independently performed a history and physical on Shukri Douglas. All diagnostic, treatment, and disposition decisions were made by myself in conjunction with the advanced practice provider. HPI: Shukri Douglas presented with   Chief Complaint   Patient presents with    Shortness of Breath     [facility] pt to ER with increased SOB for 2 days, \"since she was discharged from ER\", unsure of date of discharge. pt dneies pain, or previous sickness. wearing nasal cannula at Er arrival     Shukri Douglas has a past medical history of Anemia, Hyperlipidemia, Hypertension, Nonalcoholic fatty liver disease (2016), Thyroid disease, and Unspecified cerebral artery occlusion with cerebral infarction. She has a past surgical history that includes Cholecystectomy and Hysterectomy. No current facility-administered medications on file prior to encounter. Current Outpatient Medications on File Prior to Encounter   Medication Sig Dispense Refill    ipratropium-albuterol (DUONEB) 0.5-2.5 (3) MG/3ML SOLN nebulizer solution Inhale 1 vial into the lungs every 6 hours as needed for Shortness of Breath      LACTOBACILLUS BIFIDUS PO Take 2 capsules by mouth daily      [START ON 7/24/2019] furosemide (LASIX) 20 MG tablet Take 20 mg by mouth daily      guaiFENesin (MUCINEX) 600 MG extended release tablet Take 1,200 mg by mouth 2 times daily      predniSONE (DELTASONE) 10 MG tablet Take 20 mg by mouth daily 20mg QD for 3 days then 10mg QD for 3 days then 5mg QD for 3 days      cefTRIAXone sodium 2 g SOLR Infuse 1 g intravenously daily      traMADol (ULTRAM) 50 MG tablet Take 25 mg by mouth 2 times daily.       amLODIPine (NORVASC) 10 MG tablet Take 1 tablet by mouth daily 30 tablet 3    levothyroxine (SYNTHROID) 125 MCG tablet

## 2019-07-24 PROBLEM — J96.01 ACUTE RESPIRATORY FAILURE WITH HYPOXIA (HCC): Status: ACTIVE | Noted: 2019-01-01

## 2019-07-24 NOTE — CONSULTS
Palliative Care:      80year old female with history of CVA and KRAUS presents to hospital from [facility] with fever, shortness of breath, lethargy. CXR shows pneumonia, patient was stared on IV hydration, antibiotics, nebulizer treatments, supplemental oxygen. Pulmonary to see. CXR worse this am, patient developing worsening respiratory failure. She was placed on 13 liters oxygen on non re breather mask. Patient may have aspiration pneumonia. Dependent edema noted. CXR 7/24/19:  FINDINGS:   Interval worsening of patchy airspace disease throughout the entire right   lung.  No interval change of large infiltrate in the left lung.       Bilateral pleural effusions, stable.  No pneumothorax.  No free air below the   diaphragm.       Incidental right cervical rib at C7.        Impression   1.  Interval worsening of patchy airspace disease throughout the entire right   lung.  No interval change of large infiltrate in the left lung.       2.  Bilateral pleural effusions, stable. Past Medical History:   has a past medical history of Anemia, Hyperlipidemia, Hypertension, Nonalcoholic fatty liver disease, Thyroid disease, and Unspecified cerebral artery occlusion with cerebral infarction. Past Surgical History:   has a past surgical history that includes Cholecystectomy and Hysterectomy. Advance Directives:      Code status is DNR CC, daughter Sunshine Bergman is DPOA    Problem Severity: Pain/Other Symptoms:      Patient is moaning and appears short of breath    Bed Mobility/Toileting/Transfer:      Patient needs assistance with ADLs    Performance Status:      Patient has a recent history of 6 falls. She is now in a wheelchair at [facility]. Symptom Assessment: Appetite/Nausea/Bowels/Fatigue:   Patient has been on a regular diet. Social History:   reports that she has never smoked. She has never used smokeless tobacco. She reports that she does not drink alcohol or use drugs.     Family History:  family

## 2019-07-24 NOTE — PROGRESS NOTES
Pharmacy Note  Vancomycin Consult    Wilma Rivera is a 80 y.o. female started on Vancomycin for HCAP; consult received from Dr. Hakeem Abbott to manage therapy. Also receiving the following antibiotics: cefepime. Patient Active Problem List   Diagnosis    Lactic acidosis    High cholesterol    HTN (hypertension)    Hypothyroid    Pneumonia    Late effects of cerebral ischemic stroke    Acute respiratory failure with hypoxia (HCC)       Allergies:  Patient has no known allergies. Temp max: afebrile    Recent Labs     07/23/19  1504   BUN 38*       Recent Labs     07/23/19  1504   CREATININE 1.3*       Recent Labs     07/23/19  1504   WBC 9.7       No intake or output data in the 24 hours ending 07/24/19 1055    Culture Date      Source                       Results  pending    Ht Readings from Last 1 Encounters:   07/24/19 5' 4\" (1.626 m)        Wt Readings from Last 1 Encounters:   07/24/19 213 lb 0.9 oz (96.6 kg)         Body mass index is 36.57 kg/m². Estimated Creatinine Clearance: 36 mL/min (A) (based on SCr of 1.3 mg/dL (H)). Goal Trough Level: 15-20 mcg/mL    Assessment/Plan:  Will initiate vancomycin 1250 mg IV every 24 hours. Trough scheduled for 7/25/19 at 1700, prior to 3rd dose of dosing regimen. Recommend discontinuation of vancomycin in MRSA nasal PCR is negative. Timing of trough level and dosing adjustments will be determined based on culture results, renal function, and clinical response. Thank you for the consult. Pharmacy will continue to follow.      Paresh Villafana, PharmD  Clinical Pharmacist [id number]  7/24/2019

## 2019-07-24 NOTE — PROGRESS NOTES
Patient Active Problem List   Diagnosis    Lactic acidosis    High cholesterol    HTN (hypertension)    Hypothyroid    Pneumonia   Late effects stroke    H&P dictated

## 2019-07-24 NOTE — PROGRESS NOTES
PM assessment complete and documented. Meds given per MAR. High flow nc secure. Pt is hard to understand at times. No needs expressed. Will continue to monitor.

## 2019-07-24 NOTE — H&P
[place] [address]                     [address] [facility], [address]                              HISTORY AND PHYSICAL    PATIENT NAME: Cathi Gillespie                    :        1934  MED REC NO:   [id number]                          ROOM:       3079  ACCOUNT NO:   [de-identified]                           ADMIT DATE: 2019  PROVIDER:     Myrtle Yost MD      HISTORY OF PRESENT ILLNESS:  The patient is an 80-year-old white lady,  came to the emergency room with history of increasing shortness of  breath, lethargy, fever. She came in from [facility] where she is  a long-term resident, has increased shortness of breath for the last two  days. The patient was recently discharged from the ER following a fall  evaluation, was found to have urinary tract infection. The patient  denies any unusual pain, appears to be moderately dyspneic. The patient  is overall a poor historian. She was on nasal cannula oxygen. PAST MEDICAL HISTORY:  Pertinent for hypertension, late-effect stroke,  hyperlipidemia, hypothyroidism, anemia, nonalcoholic fatty liver  disease. PAST SURGICAL HISTORY:  Pertinent for cholecystectomy and hysterectomy. MEDICATIONS:  Vitamin D, tramadol, Xifaxan, prednisone, potassium  chloride, Mycostatin powder, Cepacol lozenges, meclizine, Synthroid,  Chronulac, Lactobacillus bifidus, DuoNeb, hydrocortisone cream,  guaifenesin, glucosamine chondroitin, gabapentin, Lasix, Fergon, Pepcid,  Plavix, levothyroxine, Norvasc, ceftriaxone, Lipitor, Tylenol. ALLERGIES:  No known allergy. SOCIAL HISTORY:  She is a . She had two children. Now there is  one living. The only surviving child is a daughter who works at the ER  registration here at [facility].  She was always a  non-smoker, always a nondrinker all her life. She worked for [employer]  initially at [employer] and then at [employer] as a .    No history

## 2019-07-24 NOTE — PLAN OF CARE
Problem: Falls - Risk of:  Goal: Will remain free from falls  Description  Will remain free from falls  Outcome: Ongoing  Note:   High fall risk. Patient remains free from falls. Patient encouraged to use call light with any needs. Call light in reach, bed alarm on. Problem: Risk for Impaired Skin Integrity  Goal: Tissue integrity - skin and mucous membranes  Description  Structural intactness and normal physiological function of skin and  mucous membranes. Outcome: Ongoing  Note:   No signs of skin breakdown. Buttocks red but blanchable. Mepilex border in place. Frequent repositioning. Will continue to monitor. Problem: Breathing Pattern - Ineffective:  Goal: Ability to achieve and maintain a regular respiratory rate will improve  Description  Ability to achieve and maintain a regular respiratory rate will improve  Outcome: Ongoing  Note:   Remains on 13 L non rebreather. O2 saturations remain >90%. Will continue to monitor.

## 2019-07-25 NOTE — PROGRESS NOTES
Department of Internal Medicine  General Internal Medicine   Progress Note      SUBJECTIVE: gradual  deterioration in general condition  Profound resp distress    History obtained from chart review and nursing staff   General ROS: positive for  - fatigue and malaise  negative for - chills, fever or night sweats  Psychological ROS: positive for - anxiety, disorientation, irritability and memory difficulties  negative for - hallucinations or hostility  Ophthalmic ROS: negative  Respiratory ROS: positive for - cough, shortness of breath, tachypnea and wheezing  negative for - hemoptysis or stridor  Cardiovascular ROS: positive for - dyspnea on exertion, loss of consciousness and shortness of breath  negative for - chest pain  Gastrointestinal ROS: no abdominal pain, change in bowel habits, or black or bloody stools  Genito-Urinary ROS: no dysuria, trouble voiding, or hematuria  Musculoskeletal ROS: chronic pain   Neurological ROS: no TIA or stroke symptoms  Dermatological ROS: negative    OBJECTIVE      Medications      No current facility-administered medications for this encounter.      Physical      Vitals: BP (!) 74/42   Pulse (!) 32   Temp 95.5 °F (35.3 °C) (Temporal)   Resp 10   Ht 5' 4\" (1.626 m)   Wt 213 lb 0.9 oz (96.6 kg)   SpO2 (!) 72%   BMI 36.57 kg/m²   Temp: Temp: 95.5 °F (35.3 °C)  Max: Temp  Av.5 °F (35.3 °C)  Min: 95.4 °F (35.2 °C)  Max: 95.5 °F (35.3 °C)  Respiration range:  Resp  Avg: 10  Min: 10  Max: 10  Pulse Range:  Pulse  Av  Min: 32  Max: 46  Blood pressure range:  Systolic (45QKO), TGW:29 , Min:74 , BHX:421   , Diastolic (88EPP), EUR:34, Min:42, Max:72    SpO2  Av %  Min: 72 %  Max: 86 %  No intake or output data in the 24 hours ending 19    Vent settings:  Pulse  Av.4  Min: 32  Max: 99  Resp  Av.8  Min: 10  Max: 26  SpO2  Av.6 %  Min: 72 %  Max: 98 %    CONSTITUTIONAL:  fatigued, somnolent, uncooperative, distracted, severe distress and appears 105 99 - 110 mmol/L    CO2 26 21 - 32 mmol/L    Anion Gap 11 3 - 16    Glucose 138 (H) 70 - 99 mg/dL    BUN 38 (H) 7 - 20 mg/dL    CREATININE 1.3 (H) 0.6 - 1.2 mg/dL    GFR Non-African American 39 (A) >60    GFR  47 (A) >60    Calcium 10.2 8.3 - 10.6 mg/dL    Total Protein 7.0 6.4 - 8.2 g/dL    Alb 3.4 3.4 - 5.0 g/dL    Albumin/Globulin Ratio 0.9 (L) 1.1 - 2.2    Total Bilirubin 1.9 (H) 0.0 - 1.0 mg/dL    Alkaline Phosphatase 122 40 - 129 U/L    ALT 53 (H) 10 - 40 U/L    AST 84 (H) 15 - 37 U/L    Globulin 3.6 g/dL   Troponin    Collection Time: 07/23/19  3:04 PM   Result Value Ref Range    Troponin <0.01 <0.01 ng/mL   Brain Natriuretic Peptide    Collection Time: 07/23/19  3:04 PM   Result Value Ref Range    Pro- (H) 0 - 449 pg/mL   Protime-INR    Collection Time: 07/23/19  3:04 PM   Result Value Ref Range    Protime 33.8 (H) 9.8 - 13.0 sec    INR 2.96 (H) 0.86 - 1.14   Lactate, Sepsis    Collection Time: 07/23/19  3:04 PM   Result Value Ref Range    Lactic Acid, Sepsis 2.0 (H) 0.4 - 1.9 mmol/L   Culture Blood #2    Collection Time: 07/23/19  4:30 PM   Result Value Ref Range    Culture, Blood 2       No Growth to date. Any change in status will be called. Culture blood #1    Collection Time: 07/23/19  4:30 PM   Result Value Ref Range    Blood Culture, Routine       No Growth to date. Any change in status will be called. Lactate, Sepsis    Collection Time: 07/23/19  6:22 PM   Result Value Ref Range    Lactic Acid, Sepsis 2.8 (H) 0.4 - 1.9 mmol/L   Lactic acid, plasma    Collection Time: 07/24/19 11:02 AM   Result Value Ref Range    Lactic Acid 1.9 0.4 - 2.0 mmol/L       ASSESSMENT AND PLAN     Active Problems:    High cholesterol    HTN (hypertension)    Hypothyroid    Pneumonia    Late effects of cerebral ischemic stroke    Acute respiratory failure with hypoxia (HCC)  Resolved Problems:    * No resolved hospital problems.  *    Condition progressively worse with no sign of improvement

## 2019-07-25 NOTE — PLAN OF CARE
Problem: Coping:  Goal: Family's ability to cope with current situation will improve  Description  Family's ability to cope with current situation will improve  Outcome: Ongoing  Note:   Family at bedside. Encouraged them to talk to [facility] and hold her hand. Music playing. Will continue to monitor.

## 2019-07-25 NOTE — PROGRESS NOTES
Call placed to [facility] per protocol. Spoke to [facility]. Ref #[id number]. Will call back to notify is pt is a candidate for donation.

## 2019-07-25 NOTE — PROGRESS NOTES
Color ashen with heart rate in the 30's. Difficult to hear heart beat or palpate radial pulses. Family remains at bedside. Morphine given for discomfort. Will continue to monitor.

## 2019-07-28 LAB
BLOOD CULTURE, ROUTINE: NORMAL
CULTURE, BLOOD 2: NORMAL

## 2019-08-01 NOTE — DISCHARGE SUMMARY
[facility]                     [address] [facility], [address]                               DISCHARGE SUMMARY    PATIENT NAME: Victorina No                    :        1934  MED REC NO:   [id number]                          ROOM:       5593  ACCOUNT NO:   [de-identified]                           ADMIT DATE: 2019  PROVIDER:     Orlando Zuniga MD                  DISCHARGE DATE:  2019    FINAL DIAGNOSES:  1. Healthcare associated pneumonia. 2.  Acute respiratory failure. 3.  Aspiration pneumonitis. 4.  Dehydration. 5.  Hyperlipidemia. 6.  Hypertension. 7.  Multisystem disease. 8.  Severe encephalopathy. 9.  Hypothyroidism. 10.  Acute respiratory failure. DISCHARGE MEDICATIONS:  None. HOSPITAL COURSE:  This elderly woman with prior late effect stroke and  advanced dementia came to the emergency room with increasing shortness  of breath, acute respiratory failure and evident aspiration pneumonia. The patient was febrile with vital signs instability and had virtual  ongoing failure to thrive. Temperature was 98.1, blood pressure 132/82,  respirations 20, heart rate 82. Chest x-ray shows multifocal pneumonia. The patient had a transthoracic echocardiogram that showed LV ejection  fraction of 65% to 70% with indeterminate diastolic dysfunction. The  patient's daughter works as a  in the emergency room  here. She is very realistic about the end of life situation, and she at  this point wants her mother to be kept comfortable. Her BUN was 38,  creatinine 1.3. She had virtually no p.o. intake. Her white blood cell  count 9.7, hemoglobin and hematocrit 13.4 and 39.5, platelet count was  86. She had no reasonable hope or likelihood of having a functional and  meaningful recovery. The patient had bilateral pleural effusion and  steady worsening of the airspace disease throughout entire right lung.    At this stage,

## 2023-11-21 NOTE — PROGRESS NOTES
CLINICAL BEDSIDE SWALLOWING EVALUATION  Speech Therapy Department    Patient Name:  Moreno Sanders  :  1934  Pain level: Pt did not report pain  Medical Diagnosis:   RADHA (acute kidney injury) ([facility] [N17.9]  [facility] ([facility] [N17.9]     HPI: Per chart: Moreno Sanders is a 80 y.o. female who has a past medical history of Anemia, Hyperlipidemia, Hypertension, Thyroid disease, and Unspecified cerebral artery occlusion with cerebral infarction. Patient presents with ARF (acute renal failure) ([facility] HPI   80 y. o. female presents for evaluation after a witnessed fall that occurred just prior to arrival. Patient reports that she felt dizzy which caused her to fall backwards. She didn't hit her head on the edge of her bed and has since been complaining of headache, continued dizziness, back and bilateral hip pain, left greater than right. Hip pain rated 6/10. Throbbing in nature. Currently constant in timing. Daughter reports the patient has been complaining of hip pain for the past couple of weeks and has had difficulty getting and out of cars and ambulating. During my evaluation, patient also started reporting difficulty or changes in her vision. She does have history of dementia and aphasia, per daughter. Daughter reports that squad had told her initially that one pupil was larger than the other. She is anticoagulated on Coumadin and Plavix. She denies any numbness, tingling or weakness of her extremities. No saddle anesthesia, bladder retention or bowel incontinence. No chest pain or shortness of breath. No abdominal pain, nausea or vomiting. She has no other complaints or concerns at this time. Chest x-ray:  1. Mild pulmonary edema. CT of Head:  No acute intracranial abnormality    Treatment Diagnosis:  Mild Oropharyngeal Dysphagia    Impressions: Pt was seen sitting upright in bed, her daughter was present for portion of session and provided some history.   Pt has a history of CVA with residual expressive aphasia. Pt's daughter reported she is at baseline for speech and cognition. Pt consumes a regular texture diet with thin liquids at [facility]. Various textures were provided to assess swallowing function. Pt presents with mild oropharyngeal dysphagia characterized by prolonged and mildly disorganized mastication, suspected premature bolus loss to pharynx, delayed swallow initiation, reduced laryngeal elevation upon manual palpation, and intermittent use of second swallow. Thin liquids via cup revealed intermittent use of second swallow, suspect due to mildly reduced pharyngeal clearing. No overt clinical s/s of aspiration/penetration were assessed with thin liquids. Solid trials revealed prolonged mastication with adequate oral clearance given extra time. At this time, a regular texture diet with thin liquids is recommended with ongoing use of aspiration precautions. Dietary Recommendations: Regular texture diet with thin liquids, meds in puree    Strategies: 90 degree positioning with all p.o. intake; small bites/sips; alternate textures through meal; reduce rate of intake    Treatment/Goals: Speech therapy for dysphagia tx 3-5 times per week. ST.) Pt will tolerate recommended diet without s/s of aspiration   2.) If clinical symptoms of penetration/aspiration continue to be noted,Pt will tolerate MBS to r/o aspiration and determine appropriate diet/liquid level.    3.) Pt will improve oral motor function via bolus control exercises 5/5    Oral motor Exam:  Dentition: upper and lower dentures  Labial/Facial: reduced ROM and coordination  Lingual: reduce ROM and coordination   Voice: within functional limits    Oral Phase:   Prolonged mastication  Suspected premature bolus loss to pharynx  Uncleared oral stasis  Anterior bolus loss    Pharyngeal Phase:  Suspected pharyngeal pooling  Delayed swallow initiation  Decreased laryngeal elevation via palpation   Suspected decreased pharyngeal clearing, intermittent use of second swallow with thin liquids    Patient/Family Education:Education, results and recommendations given to the Pt, her daughter, and nurse, who verbalized understanding    Timed Code Treatment: 0 minutes    Total Treatment Time: 25 minutes    Discharge Recommendations: Speech Therapy for Speech/Dysphagia treatment at discharge.     Kd Zavaleta M.A., [address] [place] [address]  Speech-Language Pathologist Admission